# Patient Record
Sex: FEMALE | Race: OTHER | NOT HISPANIC OR LATINO | Employment: FULL TIME | URBAN - METROPOLITAN AREA
[De-identification: names, ages, dates, MRNs, and addresses within clinical notes are randomized per-mention and may not be internally consistent; named-entity substitution may affect disease eponyms.]

---

## 2018-08-10 ENCOUNTER — OFFICE VISIT CONVERTED (OUTPATIENT)
Dept: PULMONOLOGY | Facility: CLINIC | Age: 28
End: 2018-08-10
Attending: PHYSICIAN ASSISTANT

## 2018-12-07 ENCOUNTER — OFFICE VISIT CONVERTED (OUTPATIENT)
Dept: PULMONOLOGY | Facility: CLINIC | Age: 28
End: 2018-12-07
Attending: INTERNAL MEDICINE

## 2019-01-21 ENCOUNTER — HOSPITAL ENCOUNTER (OUTPATIENT)
Dept: CARDIOLOGY | Facility: HOSPITAL | Age: 29
Discharge: HOME | End: 2019-01-21
Attending: INTERNAL MEDICINE

## 2021-03-18 ENCOUNTER — OFFICE VISIT CONVERTED (OUTPATIENT)
Dept: UROLOGY | Facility: CLINIC | Age: 31
End: 2021-03-18
Attending: NURSE PRACTITIONER

## 2021-03-18 ENCOUNTER — CONVERSION ENCOUNTER (OUTPATIENT)
Dept: SURGERY | Facility: CLINIC | Age: 31
End: 2021-03-18

## 2021-03-18 LAB
BILIRUB UR QL STRIP: NEGATIVE
COLOR UR: YELLOW
CONV BACTERIA IN URINE MICRO: 0
CONV CALCIUM OXALATE CRYSTALS /HPF IN URINE SEDIMENT BY MICROSCOPY: 0
CONV CLARITY OF URINE: CLEAR
CONV PROTEIN IN URINE BY AUTOMATED TEST STRIP: NEGATIVE
CONV UROBILINOGEN IN URINE BY AUTOMATED TEST STRIP: 0.2
GLUCOSE UR QL: NEGATIVE
HGB UR QL STRIP: NEGATIVE
KETONES UR QL STRIP: NEGATIVE
LEUKOCYTE ESTERASE UR QL STRIP: NORMAL
NITRITE UR QL STRIP: NEGATIVE
PH UR STRIP.AUTO: 6 [PH]
RBC #/AREA URNS HPF: 0 /[HPF]
RENAL EPI CELLS #/AREA URNS HPF: 0 /[HPF]
SP GR UR: >=1.03
SQUAMOUS SPT QL MICRO: 0
WBC #/AREA URNS HPF: 0 /[HPF]

## 2021-04-30 LAB
EXTERNAL ABO GROUPING: NORMAL
EXTERNAL ANTIBODY SCREEN: NORMAL
EXTERNAL RH FACTOR: POSITIVE
RUBV IGG SERPL IA-ACNC: NORMAL

## 2021-05-10 NOTE — H&P
History and Physical      Patient Name: Bing Pacheco   Patient ID: [id number]   Sex: Female   YOB: 1990    Primary Care Provider: PAOLA CROCKER   Referring Provider: PAOLA CROCKER    Visit Date: March 18, 2021    Provider: OBI Waddell   Location: [facility]   Location Address: [address] [place] [address]  [id number]   Location Phone: [phone]          Chief Complaint  · pt here for urological concerns      History Of Present Illness  The patient is a 30 year old /White female, who is a consultation from PAOLA CROCKER, for the evaluation of mixed incontinence which the patient noted seemed associated with no known event. The patient notes that the stress component is more bothersome.   Symptoms:  She has had recurrent episodes episodes of incontinence with and without urgency. She describes leakage of small amounts of urine. The symptoms are getting worse. The patient uses 5 pads a day.   The following aggravating factors are noted coughing, laughing, exercising, jogging, sneezing, rapid movement, transferring to standing position, walking upstairs, and lifting. The patient drinks 2 caffeinated drinks a day. She reports no additional complaints. She denies dysuria and gross hematuria.   History:  The patient has not been previously evaluated for this condition. Her past medical history is significant for obesity. She denies a history of kidney stones, cerebrovascular accidents, and delivery of a greater than 8 pound child.   Meds:  Current meds have not been tried.        She is not interested in procedures.       Past Medical History  Disease Name Date Onset Notes   Asthma --  --    Urinary Incontinence 03/18/2021 --          Past Surgical History  Procedure Name Date Notes   *I have had no surgeries --  --          Allergy List  Allergen Name Date Reaction Notes   Bactrim DS --  --  --          [facility]  "History  Disease Name Relative/Age Notes   Diabetes, unspecified type Mother/   Mother; Aunt (maternal)         Social History  Finding Status Start/Stop Quantity Notes   Tobacco --  --/-- --  --          Review of Systems  · Constitutional  o Denies  o : fever, chills  · Eyes  o Denies  o : double vision, cataracts  · HENT  o Denies  o : hearing loss, headaches  · Cardiovascular  o Denies  o : chest pain at rest, chest pain with exercise, irregular heart beats, palpitations, leg cramps with exercise  · Respiratory  o Denies  o : shortness of breath, wheezing, sleep apnea  · Gastrointestinal  o Denies  o : heartburn or indigestion, nausea or vomiting, change in abdominal girth, diarrhea, constipation, blood in stools  · Genitourinary  o Admits  o : additional symptoms as noted in HPI  · Integument  o Denies  o : rash, new skin lesions  · Neurologic  o Denies  o : memory difficulties, headache, mini-strokes, seizures  · Endocrine  o Denies  o : hot flashes, thyroid disorders  · Allergic-Immunologic  o Denies  o : immune deficiency, HIV, Hepatitis C      Vitals  Date Time BP Position Site L\R Cuff Size HR RR TEMP (F) WT  HT  BMI kg/m2 BSA m2 O2 Sat FR L/min FiO2 HC       03/18/2021 09:23 AM       15  218lbs 0oz 5'  4\" 37.42 2.11             Physical Examination  · Constitutional  o Appearance  o : Well nourished, well developed patient in no acute distress. Ambulating without difficulty.  · Head and Face  o Head  o :   § Inspection  § : atraumatic, normocephalic  o Face  o :   § Inspection  § : no facial lesions  · Eyes  o Sclerae  o : sclerae white  · Ears, Nose, Mouth and Throat  o Ears  o :   § External Ears  § : appearance within normal limits, no lesions present  o Nose  o :   § External Nose  § : appearance normal  · Neck  o Inspection/Palpation  o : normal appearance, trachea midline  · Respiratory  o Respiratory Effort  o : Breathing is unlabored without accessory muscle use  o Inspection of Chest  o : normal " appearance, no retractions  · Skin and Subcutaneous Tissue  o General Inspection  o : No rashes, lesions or areas of discoloration present. Skin turgor is normal.  · Neurologic  o Mental Status Examination  o :   § Orientation  § : grossly oriented to person, place and time  § Speech/Language  § : communication ability within normal limits  o Gait and Station  o : normal gait, able to stand without difficulty  · Psychiatric  o Judgement and Insight  o : judgment and insight intact, judgement for everyday activities and social situations within normal limits, insight intact  o Mood and Affect  o : mood normal, affect appropriate          Results  · In-Office Procedures  o Lab procedure  § Automated dipstick urinalysis with microscopy (97474)   § Color Ur: Yellow   § Clarity Ur: Clear   § Glucose Ur Ql Strip: Negative   § Bilirub Ur Ql Strip: Negative   § Ketones Ur Ql Strip: Negative   § Sp Gr Ur Qn: >=1.030   § Hgb Ur Ql Strip: Negative   § pH Ur-LsCnc: 6.0   § Prot Ur Ql Strip: Negative   § Urobilinogen Ur Strip-mCnc: 0.2   § Nitrite Ur Ql Strip: Negative   § WBC Est Ur Ql Strip: Trace   § RBC UrnS Qn HPF: 0   § WBC UrnS Qn HPF: 0   § Bacteria UrnS Qn HPF: 0   § Crystals UrnS Qn HPF: 0   § Epithelial Cells (non renal): 0 /HPF  § Epithelial Cells (renal): 0   o Surgical procedure  § IOP - Bladder Scan/Residual Urine (64899)   § Specimen vol Ur: 74       Assessment  · Urinary Incontinence     788.30/R32      Plan  · Orders  o PELVIC FLOOR REHABILITATION CONSULT (PELRE) - 788.30/R32 - 03/18/2021  · Medications  o Medications have been Reconciled  o Transition of Care or Provider Policy  · Instructions  o DISCUSSION:  o I have discussed with the patient the diagnosis of incontinence with potential treatment options in detail. Ample time was given for questions. We will proceed with plans as outlined below.  o PLAN: will refer to pelvic PT and follow up in 6 months   o Timed voiding q 3-4 hours  o Instructions Given for  Kegel Exercises  o Double voiding  o Avoid bladder irritants  o Cut down nighttime fluids  o bladder matters book given.   o Electronically Identified Patient Education Materials Provided Electronically  · Referrals  o ID: [id number] Date: 02/23/2021 Type: Inbound  Specialty: Urology            Electronically Signed by: Raina Mendenhall APRN -Author on March 18, 2021 09:56:49 AM

## 2021-05-14 VITALS — WEIGHT: 218 LBS | RESPIRATION RATE: 15 BRPM | HEIGHT: 64 IN | BODY MASS INDEX: 37.22 KG/M2

## 2021-05-26 LAB — EXTERNAL NIPT: NEGATIVE

## 2021-05-28 VITALS
HEART RATE: 80 BPM | HEIGHT: 65 IN | SYSTOLIC BLOOD PRESSURE: 121 MMHG | BODY MASS INDEX: 39.5 KG/M2 | WEIGHT: 237.06 LBS | RESPIRATION RATE: 16 BRPM | TEMPERATURE: 98.5 F | OXYGEN SATURATION: 97 % | DIASTOLIC BLOOD PRESSURE: 60 MMHG

## 2021-05-28 VITALS
OXYGEN SATURATION: 100 % | BODY MASS INDEX: 39.38 KG/M2 | RESPIRATION RATE: 14 BRPM | HEART RATE: 138 BPM | HEIGHT: 64 IN | TEMPERATURE: 98.1 F | DIASTOLIC BLOOD PRESSURE: 75 MMHG | WEIGHT: 230.69 LBS | SYSTOLIC BLOOD PRESSURE: 102 MMHG

## 2021-05-28 NOTE — PROGRESS NOTES
Patient: TOBIN ROBLES     Acct: [id number]     Report: #[id number]  UNIT #: [id number]     : 1990    Encounter Date:2018  PRIMARY CARE: WING AMATO  ***Signed***  --------------------------------------------------------------------------------------------------------------------  Chief Complaint      Encounter Date      Dec 7, 2018            Primary Care Provider      WING AMATO            Referring Provider      WING AMATO            Patient Complaint      Patient is complaining of      Pt here for 3 month follow up/chest ct results/ Pulmonary Embolism            VITALS      Height 5 ft 5 in / 165.1 cm      Weight 237 lbs 1 oz / 107.096879 kg      BSA 2.13 m2      BMI 39.4 kg/m2      Temperature 98.5 F / 36.94 C - Oral      Pulse 80      Respirations 16      Blood Pressure 121/60 Sitting, Left Arm      Pulse Oximetry 97%, room air      Initial Exhaled Nitrous Oxide      Date:  Dec 7, 2018      Exhaled Nitrous Oxide Results:  12            HPI      The patient is a 28 year old obese female who had pulmonary embolism and was     treated with Xarelto back in July. She followed up in our office with Mindi Chaudhry and she has been on 6 months of anticoagulation.  She has worked on     weight loss but it has been difficult for her. She has only recently started to     try and exercise the past 1 week. She also reports a history of asthma. She only    has a rescue inhaler and has been using this intermittently. She reports     shortness of breath with minimal exertion, she has some wheezing and coughing     that is nonproductive. She denies any chest pain, hemoptysis, lower extremity     edema. The patient reports fatigue and snoring as well as apnea spells at night.          Review of Systems as noted.             Past family, medical, surgical and social histories were all reviewed by myself     with the patient and are unchanged.            Medications were reviewed by  myself with the patient and updated in the chart.            ROS      Constitutional:  Denies: Fatigue, Fever, Weight gain, Weight loss, Chills,     Insomnia, Other      Respiratory/Breathing:  Complains of: Shortness of air, Wheezing, Cough; Denies:    Hemoptysis, Pleuritic pain, Other      Endocrine:  Denies: Polydipsia, Polyuria, Heat/cold intolerance, Abnorml     menstrual pattern, Diabetes, Other      Eyes:  Denies: Blurred vision, Vision Changes, Other      Ears, nose, mouth, throat:  Denies: Mouth lesions, Thrush, Throat pain,     Hoarseness, Allergies/Hay Fever, Post Nasal Drip, Headaches, Recent Head Injury,    Nose Bleeding, Neck Stiffness, Thyroid Mass, Hearing Loss, Ear Fullness, Dry     Mouth, Nasal or Sinus Pain, Dry Lips, Nasal discharge, Nasal congestion, Other      Cardiovascular:  Denies: Palpitations, Syncope, Claudication, Chest Pain, Wake     up Gasping for air, Leg Swelling, Irregular Heart Rate, Cyanosis, Dyspnea on     Exertion, Other      Gastrointestinal:  Denies: Nausea, Constipation, Diarrhea, Abdominal pain, V    omiting, Difficulty Swallowing, Reflux/Heartburn, Dysphagia, Jaundice, Bloating,    Melena, Bloody stools, Other      Genitourinary:  Denies: Urinary frequency, Incontinence, Hematuria, Urgency,     Nocturia, Dysuria, Testicular problems, Other      Musculoskeletal:  Denies: Joint Pain, Joint Stiffness, Joint Swelling, Myalgias,    Other      Hematologic/lymphatic:  DENIES: Lymphadenopathy, Bruising, Bleeding tendencies,     Other      Neurological:  Denies: Headache, Numbness, Weakness, Seizures, Other      Psychiatric:  Denies: Anxiety, Appropriate Effect, Depression, Other      Sleep:  No: Excessive daytime sleep, Morning Headache?, Snoring, Insomnia?, Stop    breathing at sleep?, Other      Integumentary:  Denies: Rash, Dry skin, Skin Warm to Touch, Other      Immunologic/Allergic:  Denies: Latex allergy, Seasonal allergies, Asthma,     Urticaria, Eczema, Other       Immunization status:  No: Up to date            FAMILY/SOCIAL/MEDICAL HX      Surgical History:  Yes: Other Surgeries (dnc); No: Abdominal Surgery,     Appendectomy, Bladder Surgery, Bowel Surgery, CABG, Cholecystectomy, Head     Surgery, Oral Surgery, Orthopedic Surgery, Vascular Surgery      Stroke - Family Hx:  Aunt      Heart - Family Hx:  Grandparent      Is Father Still Living?:  Yes      Is Mother Still Living?:  Yes      Social History:  Tobacco Use      Smoking status:  Never smoker      Anticoagulation Therapy:  Yes      Antibiotic Prophylaxis:  No      Medical History:  Yes: Asthma (HAVE NOT USED INHALER IN 9 YRS.), Depression,     Anxiety, Miscellaneous Medical/oth (h/o PE); No: Blood Disease,     Chemotherapy/Cancer, Chronic Bronchitis/COPD, Congestive Heart Failu, Deafness     or Ringing Ears, Diabetes, Heart Attack, Hemorrhoids/Rectal Prob, High Blood     Pressure, Shortness Of Breath, Sinus Trouble      Psychiatric History      Depression/Anxiety            PREVENTION      Hx Influenza Vaccination:  No      Influenza Vaccine Declined:  Yes      2 or More Falls Past Year?:  No      Fall Past Year with Injury?:  No      Hx Pneumococcal Vaccination:  No      Encouraged to follow-up with:  PCP regarding preventative exams.      Chart initiated by      Carmen Raya MA            ALLERGIES/MEDICATIONS      Allergies:        Coded Allergies:             SULFAMETHOXAZOLE (Verified  Adverse Reaction, Severe, 12/7/18)           TRIMETHOPRIM (Verified  Adverse Reaction, Severe, 12/7/18)      Medications    Last Reconciled on 12/7/18 17:49 by ROBBY MANN DO      Fluticasone/Vilanterol 100-25 Mcg Inh (Breo Ellipta 100-25 Mcg Inh) 1 Each     Blst.w.dev      1 PUFF INH QDAY, #1 INH 3 Refills         Prov: ROBBY MANN         12/7/18       Sertraline HCl (Sertraline*) Unknown Strength Oral.conc      PO QDAY, ML         Reported         12/7/18       MDI-Albuterol (Ventolin HFA) 8 Gm Hfa.aer.ad      1 PUFFS INH  Q4H PRN for SHORTNESS OF BREATH, #1 MDI 6 Refills         Prov: IsidoroDee PA-C         8/10/18       Escitalopram Oxalate (Escitalopram Oxalate*) 10 Mg Tablet      10 MG PO QDAY, TAB         Reported         8/10/18       busPIRone HCl (busPIRone HCl) 10 Mg Tablet      10 MG PO BID, #60 TAB         Reported         8/10/18      Current Medications      Current Medications Reviewed 12/7/18            EXAM      Vital signs reviewed.      GENERAL:  Morbidly obese female.       HEENT: Pupils are equally round and reactive to light and accommodation.      Extraocular muscles intact bilateral. Nares patent without hypertrophy of the     turbinates.  TM's are clear bilaterally. Small oropharynx without lesions or     erythema.       NECK:  Supple without tracheal deviation or lymphadenopathy. No thyromegaly     appreciated.       LYMPHATICS: No cervical or supraclavicular lymphadenopathy.       RESPIRATORY:  Clear to auscultation bilaterally, no wheezes, rales or rhonchi,     tympanic to percussion.      CVS:  Regular rate and rhythm, no murmurs, rubs or gallops, no lower extremity     edema, , equal radial pulses.      GI: Abdomen soft, truncal obesity,  nontender, nondistended, no hepatomegaly     appreciated.  Bowel sounds present in all 4 quadrants.       MUSCULOSKELETAL: No erythema, warmth or fluctuance over the major joints     including the knees, ankles, wrists and elbows.        SKIN: No rashes or lesions.       NEUROLOGICAL: Alert and oriented X 3.  No focal deficits on exam. Cranial nerves    II-XII intact bilaterally.       PSYCH: Patient has appropriate mood and affect.      Vtials      Vitals:             Height 5 ft 5 in / 165.1 cm           Weight 237 lbs 1 oz / 107.070954 kg           BSA 2.13 m2           BMI 39.4 kg/m2           Temperature 98.5 F / 36.94 C - Oral           Pulse 80           Respirations 16           Blood Pressure 121/60 Sitting, Left Arm           Pulse Oximetry 97%, room air             REVIEW      Results Reviewed      PCCS Results Reviewed?:  Yes Prev Radiology Results, Yes Previous Mecial Records      Radiographic Results               [facility]                   [facility]                PACS RADIOLOGY REPORT            Patient: TOBIN ROBLES   Acct: #[id number]   Report: #[id number]            UNIT #: [id number]    DOS: 18 0930      INSURANCE:[employer]   ORDER #:[id number]      LOCATION:[facility]     : 1990            PROVIDERS      ADMITTING:     ATTENDING: Dee Chaudhry PA-C      FAMILY:  NONE,MD   ORDERING:  Dee Chaudhry PA-C         OTHER:    DICTATING:  Manuel Weeks MD, IV            REQ #:[id number]   EXAM:CHW - CT CHEST with CONTRAST      REASON FOR EXAM:        REASON FOR VISIT:  SOA            *******Signed******         PROCEDURE:   CT CHEST W/ CONTRAST             COMPARISON:   [facility], CT, CHEST W/ CONTRAST, 2018, 16:47.             INDICATIONS:   RECENT PE, STILL ON BLOOD THINNER. FOLLOW UP AND STILL HAVING     SOA. PT DENIES       PREGNANCY.             PROTOCOL:     Pulmonary embolism imaging protocol performed                RADIATION:     DLP: 623.3mGy*cm          Automated exposure control was utilized to minimize radiation dose.              CONTRAST:   100cc Isovue 370 I.V.             LABS:     eGFR: ml/min/1.73m2             TECHNIQUE:   Axial images of the chest with intravenous contrast.             FINDINGS:      No evidence of pneumothorax or pleural effusion.  No evidence of pneumonia or     suspicious pulmonary       mass.  The thoracic aorta has a normal caliber.  No pulmonary emboli are id    entified on today's       examination.  There is fatty infiltration of the liver.  There is a 1.2 cm stone    in the       gallbladder.             IMPRESSION:              1.  No pulmonary emboli are identified.             2.  Fatty infiltration of the liver.              3.  Cholelithiasis              MARCIA BROTHERS MD             Electronically Signed and Approved By: MARCIA BROTHERS MD on 12/05/2018 at     10:08                        Until signed, this is an unconfirmed preliminary report that may contain      errors and is subject to change.                                              RUBENJE:      D:12/05/18 1008            Assessment      Asthma - J45.909            Shortness of breath - R06.02            Apnea - R06.81            Snoring - R06.83            Obesity (BMI 30-39.9) - E66.9            Notes      New Medications      * Sertraline HCl (Sertraline*) Unknown Strength ORAL.CONC: PO QDAY      * Fluticasone/Vilanterol 100-25 Mcg Inh (Breo Ellipta 100-25 Mcg Inh) 1 EACH       BLST.W.DEV: 1 PUFF INH QDAY #1      Discontinued Medications      * RIVAROXABAN (Xarelto) 15 MG TABLET: 15 MG PO BID 21 Days #42         Instructions: Avoid use of Xarelto if CrCl is less than 30 ml/hr Avoid use of       Xarelto with moderate to severe Hepatic impairment.      New Diagnostics      * 6 Min Walk With Pulse Ox, Routine         Dx: Asthma - J45.909      * PFT-Comp, PrePost,DLCO,BodyBox, Routine         Dx: Asthma - J45.909      * Basic Sleep Study, Routine         Dx: Apnea - R06.81      ASSESSMENT:      1. Acute bilateral pulmonary emboli status post 6 month treatment with  Xarelto.      2. History of PCOS, was on oral contraceptives pills at the time of her acute     pulmonary embolism.        3. Morbid obesity with BMI of 39.4.      4. History of snoring and apneic spells.       5. Mild intermittent asthma without exertion.             PLAN:       1. it is okay to stop Xarelto as she has completed 6 months of therapy.       2.  I counseled the patient on weight loss and encouraged dietary changes and to    increase her activity. She has been referred to a dietitian while she was in the    hospital.       3. In regards to her asthma, I have started her Breo 100/25 one puff  daily in     addition to her rescue inhaler.       4. We will obtain a sleep study prior to follow up.       5. Follow up in 2-3 months.            Patient Education      ACO BMI High above 25:  Counseling Given, Encouraged weight loss, Encourage     dietary changes      Patient Education Provided:  Acute Asthma, How to use an Inhaler, Sleep Apnea      Time Spent:  > 50% /Coord [facility]                 Disclaimer: Converted document may not contain table formatting or lab diagrams. Please see [employer] for the authenticated document.

## 2021-05-28 NOTE — PROGRESS NOTES
Patient: TOBIN ROBLES     Acct: [id number]     Report: #[id number]  UNIT #: [id number]     : 1990    Encounter Date:08/10/2018  PRIMARY CARE: WING AMATO  ***Signed***  --------------------------------------------------------------------------------------------------------------------  Chief Complaint      Encounter Date      Aug 10, 2018            Primary Care Provider      WING AMATO            Referring Provider      WING AMATO            Patient Complaint      Patient is complaining of      hmh f/u            VITALS      Height 5 ft 4 in / 162.56 cm      Weight 230 lbs 11 oz / 104.859027 kg      BSA 2.23 m2      BMI 39.6 kg/m2      Temperature 98.1 F / 36.72 C - Oral      Pulse 138      Respirations 14      Blood Pressure 102/75 Sitting, Left Arm      Pulse Oximetry 100%, roomair            HPI      The patient is a pleasant 27-year-old white female who was recently seen by Dr. Albarran while hospitalized at [facility]. She presented with dyspnea that gr    adually worsened over the course of the day associated with chest tightness. She    was found to have extensive bilateral lobar and segmental pulmonary emboli and     evidence of right heart strain on chest CT. She was hemodynamically stable. She     had recently had a log car ride about a month ago, 10-12 hours in a car driving     from [place] and has been started on oral contraceptives the week prior to     her presentation for PCOS. She is also obese with a BMI of 39.6. She had never     previously had an episode of pulmonary embolism or any other clotting issues.     She does have a history of asthma and is currently not taking anything for it.     She is a never smoker. She was treated with IV heparin, transitioned to Xarelto,    and is currently still on the 3 weeks of Xarelto 15 mg b.i.d. She did have an     episode actually yesterday morning where she woke up and had a bunch of blood in    her mouth.  She called her primary care provider and was instructed to present to    the emergency department where she was felt to have bleeding secondary to acute     gingivitis. She was instructed to use a soft toothbrush and brush her teeth     gently and not floss and continue on her Xarelto as prescribed. She has not had     any other bleeding issues and no bleeding issues since yesterday morning with     her mouth in particular. She is still having some mild dyspnea on exertion     relieved with rest, but has been walking and trying to increase her activity     gradually. She denies any lower extremity edema, orthopnea, or abdominal     distension. She did have an echocardiogram done while hospitalized which showed     normal LV function. Did not mention any issues with right heart strain and right    ventricle and right atrium were noted to be of normal size and with good     contractility. Her oral contraceptives were discontinued.             I have reviewed her review of systems, medical, surgical, and family history and    agree with those as entered.            ROS      Constitutional:  Denies: Fatigue, Fever, Weight gain, Weight loss, Chills,     Insomnia, Other      Respiratory/Breathing:  Complains of: Shortness of air; Denies: Wheezing, Cough,    Hemoptysis, Pleuritic pain, Other      Endocrine:  Denies: Polydipsia, Polyuria, Heat/cold intolerance, Abnorml     menstrual pattern, Diabetes, Other      Eyes:  Denies: Blurred vision, Vision Changes, Other      Ears, nose, mouth, throat:  Denies: Mouth lesions, Thrush, Throat pain,     Hoarseness, Allergies/Hay Fever, Post Nasal Drip, Headaches, Recent Head Injury,    Nose Bleeding, Neck Stiffness, Thyroid Mass, Hearing Loss, Ear Fullness, Dry     Mouth, Nasal or Sinus Pain, Dry Lips, Nasal discharge, Nasal congestion, Other      Cardiovascular:  Denies: Palpitations, Syncope, Claudication, Chest Pain, Wake     up Gasping for air, Leg Swelling, Irregular Heart Rate,  Cyanosis, Dyspnea on     Exertion, Other      Gastrointestinal:  Denies: Nausea, Constipation, Diarrhea, Abdominal pain,     Vomiting, Difficulty Swallowing, Reflux/Heartburn, Dysphagia, Jaundice,     Bloating, Melena, Bloody stools, Other      Genitourinary:  Denies: Urinary frequency, Incontinence, Hematuria, Urgency,     Nocturia, Dysuria, Testicular problems, Other      Musculoskeletal:  Denies: Joint Pain, Joint Stiffness, Joint Swelling, Myalgias,    Other      Hematologic/lymphatic:  DENIES: Lymphadenopathy, Bruising, Bleeding tendencies,     Other      Neurological:  Denies: Headache, Numbness, Weakness, Seizures, Other      Psychiatric:  Denies: Anxiety, Appropriate Effect, Depression, Other      Sleep:  No: Excessive daytime sleep, Morning Headache?, Snoring, Insomnia?, Stop    breathing at sleep?, Other      Integumentary:  Denies: Rash, Dry skin, Skin Warm to Touch, Other      Immunologic/Allergic:  Denies: Latex allergy, Seasonal allergies, Asthma,     Urticaria, Eczema, Other      Immunization status:  No: Up to date            FAMILY/SOCIAL/MEDICAL HX      Surgical History:  Yes: Other Surgeries (dnc); No: Abdominal Surgery,     Appendectomy, Bladder Surgery, Bowel Surgery, CABG, Cholecystectomy, Head     Surgery, Oral Surgery, Orthopedic Surgery, Vascular Surgery      Stroke - Family Hx:  Aunt      Heart - Family Hx:  Grandparent      Is Father Still Living?:  Yes      Is Mother Still Living?:  Yes      Social History:  Tobacco Use      Smoking status:  Never smoker      Anticoagulation Therapy:  Yes      Antibiotic Prophylaxis:  No      Medical History:  Yes: Asthma (HAVE NOT USED INHALER IN 9 YRS.), Depression,     Anxiety; No: Blood Disease, Chemotherapy/Cancer, Chronic Bronchitis/COPD,     Congestive Heart Failu, Deafness or Ringing Ears, Diabetes, Heart Attack,     Hemorrhoids/Rectal Prob, High Blood Pressure, Shortness Of Breath, Miscellaneous    Medical/oth      Psychiatric History       depression,anxiety            PREVENTION      Hx Influenza Vaccination:  No      Influenza Vaccine Declined:  Yes      2 or More Falls Past Year?:  No      Fall Past Year with Injury?:  No      Hx Pneumococcal Vaccination:  No      Encouraged to follow-up with:  PCP regarding preventative exams.      Chart initiated by      Fanny Machuca ma            ALLERGIES/MEDICATIONS      Allergies:        Coded Allergies:             SULFAMETHOXAZOLE (Verified  Adverse Reaction, Severe, 8/10/18)           TRIMETHOPRIM (Verified  Adverse Reaction, Severe, 8/10/18)      Medications    Last Reconciled on 8/10/18 10:14 by RICH MCKEON      Rivaroxaban (Xarelto) 20 Mg Tablet      20 MG PO QDAY, #30 TAB 2 Refills         Prov: Dee Chaudhry PA-C         8/10/18       MDI-Albuterol (Ventolin HFA*) 8 Gm Hfa.aer.ad      1 PUFFS INH Q4H Y for SHORTNESS OF BREATH, #1 MDI 6 Refills         Prov: Dee Chaudhry PA-C         8/10/18       (muscle relaxer ) Unknown Strength  No Conflict Check               Reported         8/10/18       Escitalopram Oxalate (Escitalopram Oxalate*) 10 Mg Tablet      10 MG PO QDAY, TAB         Reported         8/10/18       busPIRone HCl (busPIRone HCl) 10 Mg Tablet      10 MG PO BID, #60 TAB         Reported         8/10/18       Rivaroxaban (Xarelto) 15 Mg Tablet      15 MG PO BID for 21 Days, #42 TAB 0 Refills         Prov: Barrera Aguirre         7/25/18      Current Medications      Current Medications Reviewed 8/10/18            EXAM      Vital signs reviewed.      HEENT: Pupils are equally round and reactive to light and accommodation.       Extraocular muscles intact bilateral. Nares patent without hypertrophy of the     turbinates.   TMs are clear bilaterally. Small oropharynx without lesions or     erythema.      NECK:   Supple without tracheal deviation or lymphadenopathy. No thyromegaly     appreciated.      LYMPHATICS: No cervical or supraclavicular lymphadenopathy.      RESPIRATORY:    Lungs are clear to auscultation bilaterally. No wheezes, rhonchi,     or crackles appreciate. Normal work of breathing noted.      CVS:   Regular rate and rhythm, no murmurs, rubs or gallops, no lower extremity     edema, equal radial pulses.      GI: Abdomen soft, nontender, nondistended, no hepatomegaly appreciated.   Bowel     sounds present in all 4 quadrants.      MUSCULOSKELETAL: No erythema, warmth or fluctuance over the major joints     including the knees, ankles, wrists and elbows.        SKIN: No rashes or lesions.      NEUROLOGICAL: Alert and oriented X 3.   No focal deficits on exam. Cranial nerves    II-XII intact bilaterally.      PSYCH: Patient has appropriate mood and affect.      Vtials      Vitals:             Height 5 ft 4 in / 162.56 cm           Weight 230 lbs 11 oz / 104.448022 kg           BSA 2.23 m2           BMI 39.6 kg/m2           Temperature 98.1 F / 36.72 C - Oral           Pulse 138           Respirations 14           Blood Pressure 102/75 Sitting, Left Arm           Pulse Oximetry 100%, roomair            REVIEW      Results Reviewed      PCCS Results Reviewed?:  Yes Prev Lab Results, Yes Prev Radiology Results, Yes     Previous Mecial Records      Radiographic Results               [facility]                PACS RADIOLOGY REPORT            Patient: TOBIN ROBLES   Acct: #[id number]   Report: #[id number]            UNIT #: [id number]    DOS: 18 1618      INSURANCE:[employer]   ORDER #:[id number]      LOCATION:ER     : 1990            PROVIDERS      ADMITTING:     ATTENDING:       FAMILY:  NONE,MD   ORDERING:  DEBORAH TRIANA         OTHER:    DICTATING:  ANDI NOYOLA MD            REQ #:[id number]   EXAM:CHW - CT CHEST with CONTRAST      REASON FOR EXAM:  Shortness of Breath      REASON FOR VISIT:  SOA/CHEST PRESSURE            *******Signed******         PROCEDURE:   CT CHEST W/  CONTRAST             COMPARISON:   None.             INDICATIONS:   Shortness of Breath             TECHNIQUE:   After obtaining the patient's consent, CT images were obtained with    non-ionic       intravenous contrast material.               PROTOCOL:     Pulmonary embolism imaging protocol performed                RADIATION:     DLP: 769 mGy*cm          Automated exposure control was utilized to minimize radiation dose.       CONTRAST:   100 cc Isovue 370 I.V.             FINDINGS:         Visualized soft tissue structures at the base of the neck including the thyroid     are unremarkable.        There is no lower cervical or axillary lymphadenopathy.             There are bilateral pulmonary emboli involving the lobar arteries including the     left upper, left       lower, right middle and right lower lobe.  There is bowing of the     intraventricular septum       concerning for right-sided heart strain.             The heart size is normal.  There is no pericardial effusion.  The aorta is     normal in caliber.  The       main pulmonary artery measures at the upper limit of normal.  There is no     mediastinal or hilar       lymphadenopathy.  Calcified right hilar granulomas.             The tracheobronchial tree is patent.  There is no abnormal bronchial wall     thickening or       bronchiectasis.  There are calcified right lower lobe granuloma is.  There is no    pleural effusion       or pneumothorax.  No focal airspace consolidation or evidence of pulmonary     infarct.             There is cholelithiasis without evidence of acute cholecystitis.  No acute     osseous abnormality.             CONCLUSION:         Extensive bilateral lobar and segmental artery pulmonary emboli with radiologic     evidence of       right-sided heart strain. Further evaluation with echocardiography may be     indicated.               Cholelithiasis without evidence of acute cholecystitis.             This report was  communicated by telephone to RICH Aggarwal at the dictation     time shown below.                ANDI NOYOLA MD             Electronically Signed and Approved By: ANDI NOYOLA MD on 7/23/2018 at 17:27                           Until signed, this is an unconfirmed preliminary report that may contain      errors and is subject to change.                                              BATB1:      D:07/23/18 1727            [id number]  [id number]                                 [facility] [employer]                            Aleks[place] Merino  [id number]               __________________________________________________________________________             Patient Name:                   Attending Physician:      Bing Morton M.D.             Patient Visit # MR #            Admit Date  Disch Date     Location      [id number]    [id number]      07/23/2018                 [id number]             Date of Birth      1990      __________________________________________________________________________      835 - DIAGNOSTIC REPORT             2-D AND M-MODE ECHOCARDIOGRAM REPORT             DATE:  07/24/2018.             INDICATION:                                    NORMAL RANGE        TEST RESULTS      _________________________________________________________________                                                   Systolic       Diastolic      RVID                    0.7-2.4      LVID                    3.7-5.4         3.1            4.5      POST. WALL THICKNESS    0.8-1.1                        0.9      SEPTAL WALL THICKNESS   0.7-1.2                        0.9      LAID                    1.9-3.8                        3.5      AORTIC ROOT DIMENSION   2.0-3.7                        2.6      MTRL. VLV. BRIAN. D.D.R. 80mm/sec-150mm/sec       _________________________________________________________________             COMMENTS:   M-Mode and 2-D summary.             1.  MITRAL VALVE:        Normal.      2.  AORTIC VALVE:        Normal.      3.  TRICUSPID VALVE:     Normal.      4.  PULMONIC VALVE:      Normal.      5.  AORTIC ROOT:         Normal.      6.  LEFT ATRIUM:         No evidence of thrombi.      7.  LEFT VENTRICLE:      Normal-sized left ventricle with good contractility          with left ventricular ejection fraction of 70%.  No evidence of thrombi.      8.  RIGHT VENTRICLE:     Appears to be normal size with good contractility.      9.  RIGHT ATRIUM:        Normal.      10. PERICARDIUM:         No evidence of pericardial effusion.             Color Doppler study showed mild mitral regurgitation.  No evidence of any      tricuspid regurgitation seen.  No evidence of any volume or pressure overload      of the right ventricle.             CONCLUSION:      1.  Normal-sized left ventricle with good systolic function.      2.  Mild mitral regurgitation.             To be electronically signed in [employer]      [id number] ALEX MIRELES M.D.             MG:elaís      D:  07/24/2018 19:55      T:  07/24/2018 20:26      #[id number]             Until signed, this is an unconfirmed preliminary report that may contain      errors and is subject to change.                   07/25/18 1513  <Electronically signed by Alex Mireles MD>            Assessment      Pulmonary embolism - I26.99            SOB (shortness of breath) - R06.02            History of asthma - Z87.09            Notes      New Medications      * busPIRone HCl 10 MG TABLET: 10 MG PO BID #60      * ESCITALOPRAM OXALATE (Escitalopram Oxalate*) 10 MG TABLET: 10 MG PO QDAY      * (muscle relaxer ) Unknown Strength:       * MDI-Albuterol (Ventolin HFA*) 8 GM HFA.AER.AD: 1 PUFFS INH Q4H PRN SHORTNESS       OF BREATH #1         Dx: Pulmonary embolism - I26.99      * RIVAROXABAN (Xarelto) 20  MG TABLET: 20 MG PO QDAY #30         Instructions: To begin after 21 days of Xarelto 15mg po bid are complete.         Dx: Pulmonary embolism - I26.99      Discontinued Medications      * RIVAROXABAN (Xarelto) 20 MG TABLET: 20 MG PO QDAY 30 Days #30         Instructions: after completing 21 days of xarelto 15mg bid      New Diagnostics      * Chest W/ Cont CT, 3 Months         Dx: Pulmonary embolism - I26.99      ASSESSMENT:      1. Acute bilateral pulmonary emboli, currently on anticoagulation with Xarelto.      2. History of PCOS, had recently been started on oral contraceptives which are     now discontinued.      3. Morbid obesity with BMI of 39.6.      4. Dyspnea, improving.      5. History of asthma without acute exacerbation.            PLAN:       1. At this time, I have instructed the patient to complete her 3 weeks of     Xarelto 15 mg p.o. b.i.d. and then start on the Xarelto 20 mg p.o. daily. She     will likely require treatment for 3-6 months with anticoagulation, as thus far h    er hypercoagulability workup has been negative, but her PE was likely provoked     by her recent initiation of oral contraceptives in the setting of her obesity     and recent long car travel.       2. I have counseled her on increasing her activity gradually and watching her     caloric intake to work on weight loss.      3. She has had a history of asthma and is currently not taking anything for     that. I have prescribed her Ventolin rescue inhaler to use as needed and have     shown her how to use that today. We will repeat a chest CT scan with contrast     for her in 3 months to followup for resolution of her PEs.       4. I will have her followup in 3 months with Dr. Albarran or sooner if needed.            Patient Education      ACO BMI High above 25:  Counseling Given, Encouraged weight loss      Patient Education Provided:  How to use an Inhaler      Time Spent:  > 50% /[facility]                 Disclaimer:  Converted document may not contain table formatting or lab diagrams. Please see [email or web address] for the authenticated document.

## 2021-08-26 LAB
EXTERNAL HEMATOCRIT: 39 %
EXTERNAL HEMOGLOBIN: 12.7 G/DL
GLUCOSE 1H P 100 G GLC PO SERPL-MCNC: 116 MG/DL (ref 74–180)

## 2021-09-20 ENCOUNTER — TELEPHONE (OUTPATIENT)
Dept: UROLOGY | Facility: CLINIC | Age: 31
End: 2021-09-20

## 2021-09-28 ENCOUNTER — ROUTINE PRENATAL (OUTPATIENT)
Dept: OBSTETRICS AND GYNECOLOGY | Facility: CLINIC | Age: 31
End: 2021-09-28

## 2021-09-28 VITALS — DIASTOLIC BLOOD PRESSURE: 69 MMHG | SYSTOLIC BLOOD PRESSURE: 106 MMHG | WEIGHT: 221 LBS | BODY MASS INDEX: 37.93 KG/M2

## 2021-09-28 DIAGNOSIS — O99.210 OBESITY IN PREGNANCY, ANTEPARTUM: ICD-10-CM

## 2021-09-28 DIAGNOSIS — Z34.80 SUPERVISION OF OTHER NORMAL PREGNANCY: Primary | ICD-10-CM

## 2021-09-28 DIAGNOSIS — Z86.711 HISTORY OF PULMONARY EMBOLISM: ICD-10-CM

## 2021-09-28 DIAGNOSIS — N89.8 VAGINAL ITCHING: ICD-10-CM

## 2021-09-28 LAB
CANDIDA SPECIES: POSITIVE
GARDNERELLA VAGINALIS: NEGATIVE
GLUCOSE UR STRIP-MCNC: NEGATIVE MG/DL
PROT UR STRIP-MCNC: ABNORMAL MG/DL
T VAGINALIS DNA VAG QL PROBE+SIG AMP: NEGATIVE

## 2021-09-28 PROCEDURE — 99214 OFFICE O/P EST MOD 30 MIN: CPT | Performed by: OBSTETRICS & GYNECOLOGY

## 2021-09-28 PROCEDURE — 87480 CANDIDA DNA DIR PROBE: CPT | Performed by: OBSTETRICS & GYNECOLOGY

## 2021-09-28 PROCEDURE — 87660 TRICHOMONAS VAGIN DIR PROBE: CPT | Performed by: OBSTETRICS & GYNECOLOGY

## 2021-09-28 PROCEDURE — 87510 GARDNER VAG DNA DIR PROBE: CPT | Performed by: OBSTETRICS & GYNECOLOGY

## 2021-09-28 RX ORDER — FLUCONAZOLE 200 MG/1
200 TABLET ORAL
Qty: 6 TABLET | Refills: 2 | Status: SHIPPED | OUTPATIENT
Start: 2021-09-28 | End: 2021-11-04

## 2021-09-28 RX ORDER — PRENATAL VIT/IRON FUM/FOLIC AC 27MG-0.8MG
1 TABLET ORAL DAILY
COMMUNITY
End: 2022-10-12

## 2021-09-28 NOTE — PROGRESS NOTES
Chief Complaint: Scheduled visit    HPI: 31 y.o.  at 29w3d   Positive baby movement  Some left lower quadrant pain, probable round ligament pain discussed  Complains of vaginal discharge with itching  Swelling on labia x2 weeks    Vitals:    21 1009   BP: 106/69   Weight: 100 kg (221 lb)       See OB flowsheet also for pregnancy related data.  Speculum exam: Discharge noted.  Labia appears hypertrophied, normal during pregnancy.  No abscess or skin infection noted.  Chronic redness with some pigmentation between breasts.  Dispense Diflucan    A/P  1. Intrauterine pregnancy at 29w3d   2. Pregnancy Risk:  HIGH RISK        Diagnoses and all orders for this visit:    1. Supervision of other normal pregnancy (Primary)  -     POC Urinalysis Dipstick    2. Obesity in pregnancy, antepartum    3. History of pulmonary embolism    4. Vaginal itching  -     fluconazole (Diflucan) 200 MG tablet; Take 1 tablet by mouth Every 3 (Three) Days.  Dispense: 6 tablet; Refill: 2    Ultrasound ordered for growth.  Patient on Lovenox due to history of pulmonary embolism.  (While on OCP)  Growth ultrasound to monitor with hypercoagulable state.  Discussed need to change to heparin at 35 weeks  Plan induction at 39 weeks.    Encouraged fetal kick counts, 10 movements in 2 hours every day.  To labor and delivery if lack fetal movement  Routine labor warnings were discussed and indications for L & D f/u including bleeding, regular contractions, decreased fetal movement or/and rupture of membranes.   Discussed Covid vaccination in pregnancy including CDC guidelines.  Vaccination strongly encouraged with risk of potential severe illness in unvaccinated.    Pre-eclampsia symptoms were discussed and warnings were given.       -----------------------  PLAN:   Return in about 2 weeks (around 10/12/2021).    Carlos Nichols Sr., MD  2021 10:49 EDT

## 2021-10-01 ENCOUNTER — TELEPHONE (OUTPATIENT)
Dept: OBSTETRICS AND GYNECOLOGY | Facility: CLINIC | Age: 31
End: 2021-10-01

## 2021-10-10 ENCOUNTER — HOSPITAL ENCOUNTER (OUTPATIENT)
Facility: HOSPITAL | Age: 31
Discharge: HOME | End: 2021-10-10
Attending: OBSTETRICS & GYNECOLOGY | Admitting: OBSTETRICS & GYNECOLOGY

## 2021-10-10 ENCOUNTER — HOSPITAL ENCOUNTER (OUTPATIENT)
Facility: HOSPITAL | Age: 31
End: 2021-10-10
Attending: OBSTETRICS & GYNECOLOGY | Admitting: OBSTETRICS & GYNECOLOGY

## 2021-10-10 VITALS — OXYGEN SATURATION: 99 % | HEART RATE: 75 BPM | SYSTOLIC BLOOD PRESSURE: 115 MMHG | DIASTOLIC BLOOD PRESSURE: 67 MMHG

## 2021-10-10 PROCEDURE — G0463 HOSPITAL OUTPT CLINIC VISIT: HCPCS

## 2021-10-10 PROCEDURE — 59025 FETAL NON-STRESS TEST: CPT

## 2021-10-10 NOTE — SIGNIFICANT NOTE
10/10/21 1514   Nonstress Test   Reason for NST OB Triage   Acoustic Stimulator No   Uterine Irritability Yes   Contractions Irregular   Contraction Frequency (Minutes) 4-7   Fetal Assessment   Fetal Movement active   Fetal HR Assessment Method external   Fetal HR (beats/min) 130   Fetal Heart Baseline Rate normal range   Fetal HR Variability moderate (amplitude range 6 to 25 bpm)   Fetal HR Accelerations lasting at least 15 seconds; greater than/equal to 15 bpm   Fetal HR Decelerations absent   Fetal HR Tracing Category Category I   Sinusoidal Pattern Present absent   Interpretation A   Nonstress Test Interpretation A Reactive   /67   Pulse 75   LMP 2021   SpO2 99% 31 1/7 weeks gestation   on monitor at 1435 to 1520

## 2021-10-11 NOTE — PROGRESS NOTES
Obstetrical Non-stress Test Interpretation     Name:  Bing Poe  MRN: [id number]    31 y.o. female  at 31w2d    Indication: Complains of contractions  Weeks Gestation: 31w2d     Patient presented to triage with complaints of contractions.    Baseline: 140 BPM  Variability:   Moderate/Normal (amplitude 6-25 bpm)  Accelerations: Present (32 weeks+) 15 x 15 bpm  Decelerations: Absent   Contractions:  Irregular    Cervix fingertip, long, high, ballotable.  Per RN exam    Impression:  Reactive NST, not labor    Plan: Labor precautions, encourage p.o. hydration   Discharge to home.  Keep follow up per office instructions.      Carlos Nichols Sr., MD  10/11/2021  08:45 EDT

## 2021-10-14 ENCOUNTER — ROUTINE PRENATAL (OUTPATIENT)
Dept: OBSTETRICS AND GYNECOLOGY | Facility: CLINIC | Age: 31
End: 2021-10-14

## 2021-10-14 VITALS — BODY MASS INDEX: 37.93 KG/M2 | WEIGHT: 221 LBS

## 2021-10-14 DIAGNOSIS — Z86.711 HISTORY OF PULMONARY EMBOLISM: ICD-10-CM

## 2021-10-14 DIAGNOSIS — B37.9 CANDIDIASIS: ICD-10-CM

## 2021-10-14 DIAGNOSIS — O99.210 OBESITY IN PREGNANCY, ANTEPARTUM: Primary | ICD-10-CM

## 2021-10-14 DIAGNOSIS — Z34.80 SUPERVISION OF OTHER NORMAL PREGNANCY: ICD-10-CM

## 2021-10-14 LAB
CANDIDA SPECIES: NEGATIVE
GARDNERELLA VAGINALIS: NEGATIVE
GLUCOSE UR STRIP-MCNC: NEGATIVE MG/DL
PROT UR STRIP-MCNC: ABNORMAL MG/DL
T VAGINALIS DNA VAG QL PROBE+SIG AMP: NEGATIVE

## 2021-10-14 PROCEDURE — 87510 GARDNER VAG DNA DIR PROBE: CPT | Performed by: OBSTETRICS & GYNECOLOGY

## 2021-10-14 PROCEDURE — 87660 TRICHOMONAS VAGIN DIR PROBE: CPT | Performed by: OBSTETRICS & GYNECOLOGY

## 2021-10-14 PROCEDURE — 99214 OFFICE O/P EST MOD 30 MIN: CPT | Performed by: OBSTETRICS & GYNECOLOGY

## 2021-10-14 PROCEDURE — 87480 CANDIDA DNA DIR PROBE: CPT | Performed by: OBSTETRICS & GYNECOLOGY

## 2021-10-14 NOTE — PROGRESS NOTES
Chief Complaint:  Scheduled OB visit    HPI: 31 y.o.  at 31w5d   Patient complains of persistent itching and discharge even though completed course of Diflucan.  Positive baby movement    Vitals:    10/14/21 1256   Weight: 100 kg (221 lb)       See OB flowsheet also for pregnancy related data.  Fundal height 34 cm    Ultrasound today notes 59th percentile.  Vertex.  Grade 1 placenta  ARCHANA 14.9    A/P  1. Intrauterine pregnancy at 31w5d   2. Pregnancy Risk:  COMPLICATED  Complicated by obesity, Lovenox use, LGA      Diagnoses and all orders for this visit:    1. Obesity in pregnancy, antepartum (Primary)  -     US Ob 14 + Weeks Single or First Gestation; Future, this ultrasound will be canceled    2. Supervision of other normal pregnancy  -     POC Urinalysis Dipstick    3. History of pulmonary embolism  Continue Lovenox, plan change to heparin at 35 weeks    4. Candidiasis  -     terconazole (Terazol 7) 0.4 % vaginal cream; Insert 1 applicator into the vagina Every Night.  Dispense: 14 each; Refill: 2        Encouraged fetal kick counts, 10 movements in 2 hours every day.  To labor and delivery if lack fetal movement  Routine labor warnings were discussed and indications for L & D f/u including bleeding, regular contractions, decreased fetal movement or/and rupture of membranes.   -----------------------  PLAN:   Return in about 2 weeks (around 10/28/2021).    Carlos Nichols Sr., MD  10/14/2021 13:25 EDT

## 2021-10-15 ENCOUNTER — TELEPHONE (OUTPATIENT)
Dept: OBSTETRICS AND GYNECOLOGY | Facility: CLINIC | Age: 31
End: 2021-10-15

## 2021-10-15 NOTE — TELEPHONE ENCOUNTER
----- Message from Carlos Nichols Sr., MD sent at 10/15/2021  9:47 AM EDT -----  Please notify patient of no vaginal infection  ----- Message -----  From: Batool Duncan MA  Sent: 10/14/2021  12:57 PM EDT  To: Carlos Nichols Sr., MD

## 2021-10-28 ENCOUNTER — ROUTINE PRENATAL (OUTPATIENT)
Dept: OBSTETRICS AND GYNECOLOGY | Facility: CLINIC | Age: 31
End: 2021-10-28

## 2021-10-28 ENCOUNTER — PREP FOR SURGERY (OUTPATIENT)
Dept: OTHER | Facility: HOSPITAL | Age: 31
End: 2021-10-28

## 2021-10-28 ENCOUNTER — HOSPITAL ENCOUNTER (OUTPATIENT)
Facility: HOSPITAL | Age: 31
Discharge: HOME | End: 2021-10-28
Attending: OBSTETRICS & GYNECOLOGY | Admitting: OBSTETRICS & GYNECOLOGY

## 2021-10-28 VITALS
TEMPERATURE: 98.2 F | DIASTOLIC BLOOD PRESSURE: 55 MMHG | SYSTOLIC BLOOD PRESSURE: 101 MMHG | HEART RATE: 93 BPM | RESPIRATION RATE: 18 BRPM

## 2021-10-28 VITALS — DIASTOLIC BLOOD PRESSURE: 71 MMHG | SYSTOLIC BLOOD PRESSURE: 109 MMHG | BODY MASS INDEX: 39.14 KG/M2 | WEIGHT: 228 LBS

## 2021-10-28 DIAGNOSIS — Z86.711 HISTORY OF PULMONARY EMBOLISM: ICD-10-CM

## 2021-10-28 DIAGNOSIS — J01.00 ACUTE MAXILLARY SINUSITIS, RECURRENCE NOT SPECIFIED: ICD-10-CM

## 2021-10-28 DIAGNOSIS — Z34.80 SUPERVISION OF OTHER NORMAL PREGNANCY: Primary | ICD-10-CM

## 2021-10-28 LAB
GLUCOSE UR STRIP-MCNC: NEGATIVE MG/DL
PROT UR STRIP-MCNC: NEGATIVE MG/DL

## 2021-10-28 PROCEDURE — 59025 FETAL NON-STRESS TEST: CPT | Performed by: OBSTETRICS & GYNECOLOGY

## 2021-10-28 PROCEDURE — 59025 FETAL NON-STRESS TEST: CPT

## 2021-10-28 PROCEDURE — G0463 HOSPITAL OUTPT CLINIC VISIT: HCPCS

## 2021-10-28 PROCEDURE — 99214 OFFICE O/P EST MOD 30 MIN: CPT | Performed by: OBSTETRICS & GYNECOLOGY

## 2021-10-28 RX ORDER — CETIRIZINE HYDROCHLORIDE 5 MG/1
5 TABLET ORAL DAILY
Qty: 30 TABLET | Refills: 2 | Status: SHIPPED | OUTPATIENT
Start: 2021-10-28 | End: 2021-12-02 | Stop reason: HOSPADM

## 2021-10-28 RX ORDER — HEPARIN SODIUM 10000 [USP'U]/ML
10000 INJECTION, SOLUTION INTRAVENOUS; SUBCUTANEOUS 2 TIMES DAILY
Qty: 60 ML | Refills: 1 | Status: SHIPPED | OUTPATIENT
Start: 2021-11-04 | End: 2021-12-02 | Stop reason: HOSPADM

## 2021-10-28 NOTE — PROGRESS NOTES
Chief Complaint:  Scheduled OB visit    HPI: 31 y.o.  at 33w5d   Good baby movement  Patient has continued slight leaking vaginally, prior vaginitis panel was negative.  Patient reports MFM visit.  MFM has suggested staying on Lovenox until induction.  I feel it safer to switch to heparin for fear of spontaneous labor on Lovenox.  MFM discussed weekly ultrasounds with patient.    Patient complains of sinus pressure on the left side and headache  Vitals:    10/28/21 0937   BP: 109/71   Weight: 103 kg (228 lb)       See OB flowsheet also for pregnancy related data.  The heart rate 145    A/P  1. Intrauterine pregnancy at 33w5d   2. Pregnancy Risk:  COMPLICATED  Complicated by history of PE, on Lovenox  Switched to Heparin 10,000 units twice daily at 35 weeks      Diagnoses and all orders for this visit:    1. Supervision of other normal pregnancy (Primary)  -     POC Urinalysis Dipstick    2. History of pulmonary embolism  -     Heparin Sodium, Porcine, (heparin, porcine,) 92182 UNIT/ML injection; Inject 1 mL under the skin into the appropriate area as directed 2 (Two) Times a Day.  Dispense: 60 mL; Refill: 1    3. Acute maxillary sinusitis, recurrence not specified  -     cetirizine (zyrTEC) 5 MG tablet; Take 1 tablet by mouth Daily.  Dispense: 30 tablet; Refill: 2    Start weekly NSTs at the hospital    Continue prenatal vitamins.  Encouraged fetal kick counts, 10 movements in 2 hours every day.  To labor and delivery if lack fetal movement  Routine labor warnings were discussed and indications for L & D f/u including bleeding, regular contractions, decreased fetal movement or/and rupture of membranes.   Discussed Covid vaccination in pregnancy including CDC guidelines.  Vaccination strongly encouraged with risk of potential severe illness in unvaccinated.    -----------------------  PLAN:   No follow-ups on file.    Carlos Nichols Sr., MD  10/28/2021 10:18 EDT

## 2021-11-04 ENCOUNTER — HOSPITAL ENCOUNTER (OUTPATIENT)
Facility: HOSPITAL | Age: 31
Discharge: HOME | End: 2021-11-04
Attending: OBSTETRICS & GYNECOLOGY | Admitting: OBSTETRICS & GYNECOLOGY

## 2021-11-04 ENCOUNTER — ROUTINE PRENATAL (OUTPATIENT)
Dept: OBSTETRICS AND GYNECOLOGY | Facility: CLINIC | Age: 31
End: 2021-11-04

## 2021-11-04 ENCOUNTER — HOSPITAL ENCOUNTER (OUTPATIENT)
Dept: LABOR AND DELIVERY | Facility: HOSPITAL | Age: 31
Discharge: HOME | End: 2021-11-04

## 2021-11-04 VITALS — RESPIRATION RATE: 18 BRPM | DIASTOLIC BLOOD PRESSURE: 66 MMHG | HEART RATE: 80 BPM | SYSTOLIC BLOOD PRESSURE: 113 MMHG

## 2021-11-04 VITALS — WEIGHT: 227 LBS | BODY MASS INDEX: 38.96 KG/M2 | DIASTOLIC BLOOD PRESSURE: 78 MMHG | SYSTOLIC BLOOD PRESSURE: 133 MMHG

## 2021-11-04 DIAGNOSIS — O99.210 OBESITY IN PREGNANCY, ANTEPARTUM: ICD-10-CM

## 2021-11-04 DIAGNOSIS — Z86.711 HISTORY OF PULMONARY EMBOLISM: ICD-10-CM

## 2021-11-04 DIAGNOSIS — Z34.80 SUPERVISION OF OTHER NORMAL PREGNANCY: Primary | ICD-10-CM

## 2021-11-04 PROBLEM — B37.9 CANDIDIASIS: Status: RESOLVED | Noted: 2021-10-14 | Resolved: 2021-11-04

## 2021-11-04 LAB
GLUCOSE UR STRIP-MCNC: NEGATIVE MG/DL
PROT UR STRIP-MCNC: NEGATIVE MG/DL

## 2021-11-04 PROCEDURE — 59025 FETAL NON-STRESS TEST: CPT

## 2021-11-04 PROCEDURE — 59025 FETAL NON-STRESS TEST: CPT | Performed by: OBSTETRICS & GYNECOLOGY

## 2021-11-04 PROCEDURE — G0463 HOSPITAL OUTPT CLINIC VISIT: HCPCS

## 2021-11-04 PROCEDURE — 99214 OFFICE O/P EST MOD 30 MIN: CPT | Performed by: STUDENT IN AN ORGANIZED HEALTH CARE EDUCATION/TRAINING PROGRAM

## 2021-11-04 NOTE — PROGRESS NOTES
OB FOLLOW UP  Complaint   Chief Complaint   Patient presents with   • Follow up Prenatal Visit            Bing Poe is a 31 y.o.  34w5d patient being seen today for her obstetrical follow up visit. Patient denies decreased fetal movement, contractions, loss of fluid or vaginal bleeding.  No other acute complaints has been compliant with Lovenox    Her prenatal care is complicated by (and status) :    Patient Active Problem List   Diagnosis   • Supervision of other normal pregnancy   • Obesity in pregnancy, antepartum   • History of pulmonary embolism   • Acute maxillary sinusitis       All other systems reviewed and are negative.     The additional following portions of the patient's history were reviewed and updated as appropriate: allergies, current medications, past family history, past medical history, past social history, past surgical history and problem list.      EXAM:     Vital signs: /78   Wt 103 kg (227 lb)   LMP 2021   BMI 38.96 kg/m²   Appearance/psychiatric: To be in no distress  Constitutional: The patient is well nourished.  Cardiovascular: She does not have edema.  Respiratory: Respiratory effort is normal.  Gastrointestinal: Abdomen is soft, gravid, nontender, no rashes, heart tones are present, fundal height is size equals dates    Pelvic Exam: No    Urine glucose/protein: See prenatal flowsheet       Assessment and Plan    Problem List Items Addressed This Visit        Coag and Thromboembolic    History of pulmonary embolism    Relevant Medications    Heparin Sodium, Porcine, (heparin, porcine,) 90954 UNIT/ML injection       Gravid and     Supervision of other normal pregnancy - Primary    Overview     Initial visit:   wnl  • PNL:  • PAP/GC/CT/Urine culture:  • Blood type: O+  • Hx of HSV: No     Genetic testing:    • NIPS - wnl    Vaccinations:  • COVID: recommended 2021   • Influenza: recommended 2021     24-28 weeks:  • 1hr  GCT:  • Hct/Plt:  • Tdap Rx  • Rhogam indicated:      Third Trimester:  • GBS  • Breast pump:    Ultrasound:  • Dating:   • Anatomy:   • Follow up:     PLAN:               Relevant Orders    POC Urinalysis Dipstick (Completed)    Obesity in pregnancy, antepartum          Impression  1. Pregnancy at 34w5d  2. Fetal status reassuring.   3. Activity and Exercise discussed.  4. History of PE    Plan  1.  Continue routine prenatal care.  Follow-up 1 week  2.  Transition from Lovenox to heparin this week.      Patient was counseled to the following pregnancy precautions:  • Decreased fetal movement, if concern for decreased fetal movement please perform fetal kick counts you are looking for 10 movements in 2 hours.  If concern for fetal movement and not meeting that criteria, please present to triage for evaluation.  • Contractions occurring every 5 minutes for over an hour, lasting 30 to 60 seconds and progressively causing more discomfort, please seek medical attention to rule out labor  • If you believe that your water is broken, place a sanitary pad.  If pad fills in short period of time i.e. less than 5 minutes, take off pad placed another pad.  If this is saturated please present for rule out rupture of membranes  • Vaginal bleeding can be normal in pregnancy, this usually takes a form of spotting.  If having heavier bleeding like a menstrual period please present for evaluation; especially in light of severe abdominal pain this could represent a placental abruption.  • Keep all scheduled appointments as recommended.        Uriah Montejo MD  11/04/2021

## 2021-11-04 NOTE — SIGNIFICANT NOTE
11/04/21 1024   Nonstress Test   Reason for NST OB Triage  (NST HX OF DVT)   Acoustic Stimulator No   Uterine Irritability No   Contractions Not present   Fetal Assessment   Fetal Movement active   Fetal HR Assessment Method external   Fetal HR (beats/min) 124   Fetal Heart Baseline Rate normal range   Fetal HR Variability moderate (amplitude range 6 to 25 bpm)   Fetal HR Accelerations lasting at least 15 seconds   Fetal HR Decelerations absent   Interpretation A   Nonstress Test Interpretation A Reactive   34w5d  /66 (BP Location: Left arm, Patient Position: Lying)   Pulse 80   Resp 18   LMP 03/06/2021   Reason for test: (P) OB Triage (NST HX OF DVT)  Date of Test: 11/4/2021  Time frame of test: 8421-0769  RN NST Interpretation: (P) Reactive

## 2021-11-05 NOTE — NON STRESS TEST
[facility]   OB NST Note    2021   Name:  Bing Poe  MRN: [id number]    Subjective:  31 y.o.  at 34w6d    Indication: History of pulmonary embolism    NST:   Baseline: 135  Variability:   Moderate/Normal (amplitude 6-25 bpm)  Accelerations: Present (32 weeks+) 15 x 15 bpm  Decelerations: Absent   Contractions:  Absent    NST interpretation: reactive    Documented Vitals    21 1014   BP: 113/66   Pulse: 80   Resp: 18         Lab Results (last 24 hours)     Procedure Component Value Units Date/Time    POC Urinalysis Dipstick [[id number]] Collected: 21    Specimen: Urine Updated: 21 0910     Glucose, UA Negative mg/dL      Protein, POC Negative mg/dL            Cervical Exam:    Assessment:  31 y.o.  AT 34w6d  History of pulmonary embolism    Plan:   OB Precautions, HTN Precautions, FKC, Keep scheduled NSTs, Keep office visit      Electronically signed by Devaughn Perez MD, 21, 8:40 AM EDT.

## 2021-11-08 ENCOUNTER — HOSPITAL ENCOUNTER (OUTPATIENT)
Facility: HOSPITAL | Age: 31
End: 2021-11-08
Attending: STUDENT IN AN ORGANIZED HEALTH CARE EDUCATION/TRAINING PROGRAM | Admitting: STUDENT IN AN ORGANIZED HEALTH CARE EDUCATION/TRAINING PROGRAM

## 2021-11-08 ENCOUNTER — TELEPHONE (OUTPATIENT)
Dept: OBSTETRICS AND GYNECOLOGY | Facility: CLINIC | Age: 31
End: 2021-11-08

## 2021-11-08 ENCOUNTER — HOSPITAL ENCOUNTER (OUTPATIENT)
Facility: HOSPITAL | Age: 31
End: 2021-11-08

## 2021-11-08 ENCOUNTER — HOSPITAL ENCOUNTER (OUTPATIENT)
Facility: HOSPITAL | Age: 31
Discharge: HOME | End: 2021-11-08
Attending: STUDENT IN AN ORGANIZED HEALTH CARE EDUCATION/TRAINING PROGRAM | Admitting: STUDENT IN AN ORGANIZED HEALTH CARE EDUCATION/TRAINING PROGRAM

## 2021-11-08 VITALS
RESPIRATION RATE: 16 BRPM | TEMPERATURE: 97.8 F | HEART RATE: 97 BPM | SYSTOLIC BLOOD PRESSURE: 121 MMHG | DIASTOLIC BLOOD PRESSURE: 73 MMHG

## 2021-11-08 PROBLEM — Z86.711 HISTORY OF PULMONARY EMBOLISM: Status: RESOLVED | Noted: 2021-09-28 | Resolved: 2021-11-08

## 2021-11-08 PROBLEM — Z86.711 HISTORY OF PULMONARY EMBOLISM: Status: ACTIVE | Noted: 2021-11-08

## 2021-11-08 LAB
DEPRECATED RDW RBC AUTO: 38.2 FL (ref 37–54)
ERYTHROCYTE [DISTWIDTH] IN BLOOD BY AUTOMATED COUNT: 12.7 % (ref 12.3–15.4)
HCT VFR BLD AUTO: 34 % (ref 34–46.6)
HGB BLD-MCNC: 11.3 G/DL (ref 12–15.9)
MCH RBC QN AUTO: 27.4 PG (ref 26.6–33)
MCHC RBC AUTO-ENTMCNC: 33.2 G/DL (ref 31.5–35.7)
MCV RBC AUTO: 82.5 FL (ref 79–97)
PLATELET # BLD AUTO: 326 10*3/MM3 (ref 140–450)
PMV BLD AUTO: 9.9 FL (ref 6–12)
RBC # BLD AUTO: 4.12 10*6/MM3 (ref 3.77–5.28)
WBC # BLD AUTO: 12.8 10*3/MM3 (ref 3.4–10.8)

## 2021-11-08 PROCEDURE — G0463 HOSPITAL OUTPT CLINIC VISIT: HCPCS

## 2021-11-08 PROCEDURE — 59025 FETAL NON-STRESS TEST: CPT

## 2021-11-08 PROCEDURE — 59025 FETAL NON-STRESS TEST: CPT | Performed by: STUDENT IN AN ORGANIZED HEALTH CARE EDUCATION/TRAINING PROGRAM

## 2021-11-08 PROCEDURE — 99213 OFFICE O/P EST LOW 20 MIN: CPT | Performed by: STUDENT IN AN ORGANIZED HEALTH CARE EDUCATION/TRAINING PROGRAM

## 2021-11-08 PROCEDURE — 85027 COMPLETE CBC AUTOMATED: CPT | Performed by: STUDENT IN AN ORGANIZED HEALTH CARE EDUCATION/TRAINING PROGRAM

## 2021-11-08 RX ORDER — FAMOTIDINE 20 MG/1
20 TABLET, FILM COATED ORAL
Status: DISCONTINUED | OUTPATIENT
Start: 2021-11-08 | End: 2021-11-08 | Stop reason: HOSPADM

## 2021-11-08 RX ADMIN — FAMOTIDINE 20 MG: 20 TABLET ORAL at 15:28

## 2021-11-08 NOTE — SIGNIFICANT NOTE
11/08/21 1550   Nonstress Test   Reason for NST OB Triage  (allergic reaction to heparin)   Acoustic Stimulator No   Uterine Irritability No   Contractions Irregular   Fetal Assessment   Fetal Movement active   Fetal HR Assessment Method external   Fetal HR (beats/min) 135   Fetal Heart Baseline Rate normal range   Fetal HR Variability moderate (amplitude range 6 to 25 bpm)   Fetal HR Accelerations greater than/equal to 15 bpm; lasting at least 15 seconds   Fetal HR Decelerations absent   Interpretation A   Nonstress Test Interpretation A Reactive       /73 (BP Location: Right arm, Patient Position: Sitting)   Pulse 97   Temp 97.8 °F (36.6 °C) (Oral)   Resp 16   LMP 03/06/2021     Reason for test: (P) OB Triage (allergic reaction to heparin)  Date of Test: 11/8/2021  Time frame of test: 7973-8748  RN NST Interpretation: (P) Reactive    35w2d

## 2021-11-08 NOTE — TELEPHONE ENCOUNTER
"Patient states she started her heparin prescription yesterday morning. After her AM does she had some itching all over. After her PM does yesterday she had all over itching again with some hives on her neck and face. She states this AM her throat feels \"funny\". She has not taken her AM dose today yet. I advised patient to hold off on this mornings dose until I get further recommendations and to start taking Benadryl. Any further recommendations or medication changes? Please advise.  "

## 2021-11-08 NOTE — NURSING NOTE
Updated dr stewart that patient presented to triage after being told to come to l/d for evaluation due to allergic reaction from heparin shots. Patient states itching, hives and tightness in throat after last heparin shot yesterday. Patient did take benadryl. Dr stewart ordered nst and cbc.

## 2021-11-08 NOTE — PROGRESS NOTES
OB TRIAGE NOTE        Name:  Bing Poe  MRN: [id number]    31 y.o. female  at 35w2d    Chief Complaint: Reaction to medication    Subjective:Bing Poe is a 31 y.o.  35w2d who presents for evaluation for allergic reaction to newly instituted heparin.  Patient reports that she transitioned over from Lovenox to heparin yesterday morning.  After that first injection she noted that she had itching on her arms and on her abdomen.  Following their second dose last night she reports that she felt that she had hives on her neck.  She called the office this morning with the concerns of a medication reaction and was told to take Benadryl and present to triage for evaluation this afternoon.  She she feels that her breathing is at baseline and that earlier it felt like it was more mucousy then concerns for her throat swelling up.  She reports good fetal movement, no contractions, no vaginal bleeding or loss of fluid.  Review of history patient was on a heparin drip in 2018 and then transition to Xarelto for 6 months following her PE that was felt to be provoked by a long car trip as well as the recent initiation of oral contraceptive pills for PCOS.     ROS: No leaking fluid, No vaginal bleeding, No contractions, Adequate FM, No HA, No scotomata or vision changes, No RUQ/epigastric pain, No swelling, No fever/chills, No nausea, No vomiting and No abdominal pain    Objective:    Physical Exam  Constitutional:       Appearance: Normal appearance. She is obese.   HENT:      Head: Atraumatic.   Eyes:      Extraocular Movements: Extraocular movements intact.   Cardiovascular:      Rate and Rhythm: Normal rate and regular rhythm.      Pulses: Normal pulses.   Pulmonary:      Effort: Pulmonary effort is normal.   Musculoskeletal:      Cervical back: Normal range of motion.   Neurological:      Mental Status: She is alert and oriented to person, place, and time.   Skin:     General:  Skin is warm and dry.   Psychiatric:         Mood and Affect: Mood normal.         Behavior: Behavior normal.         Thought Content: Thought content normal.         Judgment: Judgment normal.   Vitals and nursing note reviewed.         Baseline: 135  Variability:   Moderate/Normal (amplitude 6-25 bpm)  Accelerations: Present (32 weeks+) 15 x 15 bpm  Decelerations: None  Contractions:  Irregular    Cervical exam: Deferred    Lab Results   Component Value Date    WBC 12.80 (H) 11/08/2021    HGB 11.3 (L) 11/08/2021    HCT 34.0 11/08/2021    MCV 82.5 11/08/2021     11/08/2021      Lab Results   Component Value Date    GLUCOSE 101 (H) 05/21/2021    BUN 8 05/21/2021    CREATININE 0.62 05/21/2021    BCR 13 05/21/2021    K 3.8 05/21/2021    CO2 23 05/21/2021    CALCIUM 9.0 05/21/2021    ALBUMIN 3.5 05/21/2021    LABIL2 0.9 (L) 05/21/2021    AST 19 05/21/2021    ALT 14 05/21/2021        Impression:  Reactive NST   Patient Active Problem List    Diagnosis    • History of pulmonary embolism [Z86.711]    • Acute maxillary sinusitis [J01.00]    • Supervision of other normal pregnancy [Z34.80]    • Obesity in pregnancy, antepartum [O99.210]       Review of literature with systemic allergy to heparin extremely rare.  Physical examination with no skin manifestations for concerns.  Work of breathing normal.  Called and discussed case with pharmacy after ruling out heparin-induced thrombocytopenia.  Other agents would have cross-reactivity.  Patient was on a heparin drip, do not suspect anaphylaxis and therefore recommend continuation of heparin for remainder of pregnancy as benefits outweigh risks.  Pretreat with Benadryl prior to injecting.     Plan: OB Precautions, Keep office visit      Electronically signed by Uriah Montejo MD, 11/08/21, 3:23 PM EST.

## 2021-11-11 ENCOUNTER — ROUTINE PRENATAL (OUTPATIENT)
Dept: OBSTETRICS AND GYNECOLOGY | Facility: CLINIC | Age: 31
End: 2021-11-11

## 2021-11-11 VITALS — WEIGHT: 230 LBS | BODY MASS INDEX: 39.48 KG/M2 | DIASTOLIC BLOOD PRESSURE: 74 MMHG | SYSTOLIC BLOOD PRESSURE: 109 MMHG

## 2021-11-11 DIAGNOSIS — Z34.80 SUPERVISION OF OTHER NORMAL PREGNANCY: Primary | ICD-10-CM

## 2021-11-11 DIAGNOSIS — Z86.711 HISTORY OF PULMONARY EMBOLISM: ICD-10-CM

## 2021-11-11 LAB
GLUCOSE UR STRIP-MCNC: NEGATIVE MG/DL
PROT UR STRIP-MCNC: NEGATIVE MG/DL

## 2021-11-11 PROCEDURE — 99213 OFFICE O/P EST LOW 20 MIN: CPT | Performed by: STUDENT IN AN ORGANIZED HEALTH CARE EDUCATION/TRAINING PROGRAM

## 2021-11-11 PROCEDURE — 87081 CULTURE SCREEN ONLY: CPT | Performed by: STUDENT IN AN ORGANIZED HEALTH CARE EDUCATION/TRAINING PROGRAM

## 2021-11-11 NOTE — PROGRESS NOTES
OB FOLLOW UP  Complaint   Chief Complaint   Patient presents with   • Routine Prenatal Visit            Bing Poe is a 31 y.o.  35w5d patient being seen today for her obstetrical follow up visit. Patient denies decreased fetal movement, contractions, loss of fluid or vaginal bleeding.  Noticing some swelling in her left arm and leg.  Improves with rest. Patient denies any headache, visual disturbances, new onset nausea vomiting, right upper quadrant pain, or new onset swelling.      Her prenatal care is complicated by (and status) :    Patient Active Problem List   Diagnosis   • Supervision of other normal pregnancy   • Obesity in pregnancy, antepartum   • Acute maxillary sinusitis   • History of pulmonary embolism       All other systems reviewed and are negative.     The additional following portions of the patient's history were reviewed and updated as appropriate: allergies, current medications, past family history, past medical history, past social history, past surgical history and problem list.      EXAM:     Vital signs: /74   Wt 104 kg (230 lb)   LMP 2021   BMI 39.48 kg/m²   Appearance/psychiatric: To be in no distress  Constitutional: The patient is well nourished.  Cardiovascular: She does not have edema.  Respiratory: Respiratory effort is normal.  Gastrointestinal: Abdomen is soft, gravid, nontender, no rashes, heart tones are present, fundal height is size equals dates    Pelvic Exam: Yes.  Presentation: cephalic. Dilation: Closed. Effacement: 50%. Station: -3.    Urine glucose/protein: See prenatal flowsheet       Assessment and Plan    Problem List Items Addressed This Visit        Coag and Thromboembolic    History of pulmonary embolism    Overview     2021 presented to triage for complaints of allergic reaction to heparin.  Review of literature with systemic allergy to heparin extremely rare.  Physical examination with no skin manifestations for  concerns.  Work of breathing normal.  Called and discussed case with pharmacy after ruling out heparin-induced thrombocytopenia.  Other agents would have cross-reactivity.  Patient was on a heparin drip, do not suspect anaphylaxis and therefore recommend continuation of heparin for remainder of pregnancy as benefits outweigh risks.  Pretreat with Benadryl prior to injecting         Relevant Medications    Heparin Sodium, Porcine, (heparin, porcine,) 64518 UNIT/ML injection       Gravid and     Supervision of other normal pregnancy - Primary    Overview     Initial visit:   wnl  • PNL:  • PAP/GC/CT/Urine culture:  • Blood type: O+  • Hx of HSV: No     Genetic testing:    • NIPS - wnl    Vaccinations:  • COVID: recommended 2021   • Influenza: recommended 2021     24-28 weeks:  • 1hr GCT:  • Hct/Plt:  • Tdap Rx  • Rhogam indicated:      Third Trimester:  • GBS  • Breast pump:    Ultrasound:  • Dating:   • Anatomy:   • Follow up:     PLAN:               Relevant Orders    Group B Streptococcus Culture - Swab, Vaginal/Rectum    POC Urinalysis Dipstick (Completed)          Impression  1. Pregnancy at 35w5d  2. Fetal status reassuring.   3. Activity and Exercise discussed.    Plan  1.  Follow-up 1 week      Patient was counseled to the following pregnancy precautions:  • Decreased fetal movement, if concern for decreased fetal movement please perform fetal kick counts you are looking for 10 movements in 2 hours.  If concern for fetal movement and not meeting that criteria, please present to triage for evaluation.  • Contractions occurring every 5 minutes for over an hour, lasting 30 to 60 seconds and progressively causing more discomfort, please seek medical attention to rule out labor  • If you believe that your water is broken, place a sanitary pad.  If pad fills in short period of time i.e. less than 5 minutes, take off pad placed another pad.  If this is saturated please present for rule out rupture  of membranes  • Vaginal bleeding can be normal in pregnancy, this usually takes a form of spotting.  If having heavier bleeding like a menstrual period please present for evaluation; especially in light of severe abdominal pain this could represent a placental abruption.  • Keep all scheduled appointments as recommended.        Uriah Montejo MD  11/11/2021

## 2021-11-15 LAB — BACTERIA SPEC AEROBE CULT: NORMAL

## 2021-11-17 ENCOUNTER — ROUTINE PRENATAL (OUTPATIENT)
Dept: OBSTETRICS AND GYNECOLOGY | Facility: CLINIC | Age: 31
End: 2021-11-17

## 2021-11-17 VITALS — SYSTOLIC BLOOD PRESSURE: 130 MMHG | BODY MASS INDEX: 38.79 KG/M2 | WEIGHT: 226 LBS | DIASTOLIC BLOOD PRESSURE: 80 MMHG

## 2021-11-17 DIAGNOSIS — Z86.711 HISTORY OF PULMONARY EMBOLISM: ICD-10-CM

## 2021-11-17 DIAGNOSIS — O99.210 OBESITY IN PREGNANCY, ANTEPARTUM: ICD-10-CM

## 2021-11-17 DIAGNOSIS — Z34.80 SUPERVISION OF OTHER NORMAL PREGNANCY: Primary | ICD-10-CM

## 2021-11-17 LAB
GLUCOSE UR STRIP-MCNC: NEGATIVE MG/DL
PROT UR STRIP-MCNC: NEGATIVE MG/DL

## 2021-11-17 PROCEDURE — 99214 OFFICE O/P EST MOD 30 MIN: CPT | Performed by: STUDENT IN AN ORGANIZED HEALTH CARE EDUCATION/TRAINING PROGRAM

## 2021-11-17 NOTE — PROGRESS NOTES
OB FOLLOW UP  Complaint   Chief Complaint   Patient presents with   • Routine Prenatal Visit            Bing Poe is a 31 y.o.  36w4d patient being seen today for her obstetrical follow up visit. Patient denies decreased fetal movement, contractions, loss of fluid or vaginal bleeding.  Has noted some increased swelling in left leg, does improve with rest.  Denies any pain, shortness of breath or palpitations.     Her prenatal care is complicated by (and status) :    Patient Active Problem List   Diagnosis   • Supervision of other normal pregnancy   • Obesity in pregnancy, antepartum   • Acute maxillary sinusitis   • History of pulmonary embolism       All other systems reviewed and are negative.     The additional following portions of the patient's history were reviewed and updated as appropriate: allergies, current medications, past family history, past medical history, past social history, past surgical history and problem list.      EXAM:     Vital signs: /80   Wt 103 kg (226 lb)   LMP 2021   BMI 38.79 kg/m²   Appearance/psychiatric: To be in no distress  Constitutional: The patient is well nourished.  Cardiovascular: She does not have edema.  Respiratory: Respiratory effort is normal.  Gastrointestinal: Abdomen is soft, gravid, nontender, no rashes, heart tones are present, fundal height is size equals dates    Pelvic Exam: No    Urine glucose/protein: See prenatal flowsheet       Assessment and Plan    Problem List Items Addressed This Visit        Coag and Thromboembolic    History of pulmonary embolism    Overview     2021 presented to triage for complaints of allergic reaction to heparin.  Review of literature with systemic allergy to heparin extremely rare.  Physical examination with no skin manifestations for concerns.  Work of breathing normal.  Called and discussed case with pharmacy after ruling out heparin-induced thrombocytopenia.  Other agents would have  cross-reactivity.  Patient was on a heparin drip, do not suspect anaphylaxis and therefore recommend continuation of heparin for remainder of pregnancy as benefits outweigh risks.  Pretreat with Benadryl prior to injecting    2021 discussion regarding heparin and continuation until 24 hours prior to induction of labor.  Discussed with patient that depending on route of delivery she will need to restart anticoagulation 6 to 12 hours after delivery and continue for at least 6 weeks postpartum.         Relevant Medications    Heparin Sodium, Porcine, (heparin, porcine,) 69888 UNIT/ML injection       Gravid and     Supervision of other normal pregnancy - Primary    Overview     Initial visit:   wnl  • PNL:  • PAP/GC/CT/Urine culture:  • Blood type: O+  • Hx of HSV: No     Genetic testing:    • NIPS - wnl    Vaccinations:  • COVID: recommended 2021   • Influenza: recommended 2021     24-28 weeks:  • 1hr GCT: wnl  • Hct/Plt: wnl   • Tdap Rx    Third Trimester:  • GBS -negative  • Breast pump:    Ultrasound:  • Dating:   • Anatomy:   • Follow up:     PLAN:               Relevant Orders    POC Urinalysis Dipstick (Completed)    Obesity in pregnancy, antepartum          Impression  1. Pregnancy at 36w4d  2. Fetal status reassuring.   3. Activity and Exercise discussed.    Plan  1.  Continue heparin prophylaxis  2.  Follow-up 1 week      Patient was counseled to the following pregnancy precautions:  • Decreased fetal movement, if concern for decreased fetal movement please perform fetal kick counts you are looking for 10 movements in 2 hours.  If concern for fetal movement and not meeting that criteria, please present to triage for evaluation.  • Contractions occurring every 5 minutes for over an hour, lasting 30 to 60 seconds and progressively causing more discomfort, please seek medical attention to rule out labor  • If you believe that your water is broken, place a sanitary pad.  If pad fills in  short period of time i.e. less than 5 minutes, take off pad placed another pad.  If this is saturated please present for rule out rupture of membranes  • Vaginal bleeding can be normal in pregnancy, this usually takes a form of spotting.  If having heavier bleeding like a menstrual period please present for evaluation; especially in light of severe abdominal pain this could represent a placental abruption.  • Keep all scheduled appointments as recommended.        Uriah Montejo MD  11/17/2021

## 2021-11-22 ENCOUNTER — ROUTINE PRENATAL (OUTPATIENT)
Dept: OBSTETRICS AND GYNECOLOGY | Facility: CLINIC | Age: 31
End: 2021-11-22

## 2021-11-22 VITALS — DIASTOLIC BLOOD PRESSURE: 79 MMHG | BODY MASS INDEX: 38.62 KG/M2 | SYSTOLIC BLOOD PRESSURE: 126 MMHG | WEIGHT: 225 LBS

## 2021-11-22 DIAGNOSIS — D68.62 LUPUS ANTICOAGULANT DISORDER (HCC): ICD-10-CM

## 2021-11-22 DIAGNOSIS — O99.210 OBESITY IN PREGNANCY, ANTEPARTUM: ICD-10-CM

## 2021-11-22 DIAGNOSIS — J45.20 MILD INTERMITTENT ASTHMA WITHOUT COMPLICATION: ICD-10-CM

## 2021-11-22 DIAGNOSIS — Z86.711 HISTORY OF PULMONARY EMBOLISM: ICD-10-CM

## 2021-11-22 DIAGNOSIS — Z34.80 SUPERVISION OF OTHER NORMAL PREGNANCY: Primary | ICD-10-CM

## 2021-11-22 PROBLEM — F90.9 ADHD: Status: ACTIVE | Noted: 2021-11-22

## 2021-11-22 PROBLEM — J01.00 ACUTE MAXILLARY SINUSITIS: Status: RESOLVED | Noted: 2021-10-28 | Resolved: 2021-11-22

## 2021-11-22 PROBLEM — J45.909 ASTHMA: Status: ACTIVE | Noted: 2021-11-22

## 2021-11-22 LAB
GLUCOSE UR STRIP-MCNC: NEGATIVE MG/DL
PROT UR STRIP-MCNC: NEGATIVE MG/DL

## 2021-11-22 PROCEDURE — 99214 OFFICE O/P EST MOD 30 MIN: CPT | Performed by: OBSTETRICS & GYNECOLOGY

## 2021-11-22 NOTE — PROGRESS NOTES
OB FOLLOW UP    CC: Scheduled OB routine FU       Prenatal care complicated by:   Patient Active Problem List   Diagnosis   • Supervision of other normal pregnancy   • Obesity in pregnancy, antepartum   • History of pulmonary embolism   • Asthma   • Lupus anticoagulant disorder (HCC)   • ADHD       Subjective:   Patient has: No complaints, No leaking fluid, No vaginal bleeding, No contractions, Adequate FM  Reports some low back pain    Objective:  Urine glucose/protein- see flow sheet      /79   Wt 102 kg (225 lb)   LMP 03/06/2021   BMI 38.62 kg/m²   See OB flow for LE edema, and cvx exam if applicable  FHT: 142 BPM   Uterine Size: 38 cm      Assessment and Plan:  Diagnoses and all orders for this visit:    1. Supervision of other normal pregnancy (Primary)  Overview:  Obesity  History of pulmonary embolism  Positive lupus anticoagulant  Asthma    Genetic testing:    • NIPS - wnl    Vaccinations:  • COVID: recommended 11/4/2021   • Influenza: recommended 11/4/2021     Patient reports she is scheduled for induction on 12/3/2021      Assessment & Plan:  Continue prenatal vitamins  Kick counts  Labor instructions  GBS negative  Patient has not received flu or Tdap vaccines.  Discussed the importance of these vaccines.  Urged to get ASAP.  Vaccine prescriptions given today.    Orders:  -     POC Urinalysis Dipstick    2. History of pulmonary embolism  Overview:  11/8/2021 presented to triage for complaints of allergic reaction to heparin.  Review of literature with systemic allergy to heparin extremely rare.  Physical examination with no skin manifestations for concerns.  Work of breathing normal.  Called and discussed case with pharmacy after ruling out heparin-induced thrombocytopenia.  Other agents would have cross-reactivity.  Patient was on a heparin drip, do not suspect anaphylaxis and therefore recommend continuation of heparin for remainder of pregnancy as benefits outweigh risks.  Pretreat with  Benadryl prior to injecting    11/17/2021 discussion regarding heparin and continuation until 24 hours prior to induction of labor.  Discussed with patient that depending on route of delivery she will need to restart anticoagulation 6 to 12 hours after delivery and continue for at least 6 weeks postpartum.    Assessment & Plan:  Continue heparin 10,000 units twice daily  Discussed if the patient is having labor symptoms and due for a dose of medication she should hold this until she feels comfortable she is not in labor or until she has been evaluated in the labor and delivery.      3. Obesity in pregnancy, antepartum  Assessment & Plan:  Discussed nutrition, exercise and appropriate weight gain in pregnancy.      4. Lupus anticoagulant disorder (HCC)    5. Mild intermittent asthma without complication  Overview:  No meds    Assessment & Plan:  Denies any symptoms, or recent exacerbations              37w2d  Reassuring pregnancy progress    Counseling: OB precautions, leaking, VB, lamar wallace vs PTL/Labor  FKC    Questions answered    Return in about 1 week (around 11/29/2021) for Recheck.      Devaughn Perez MD  11/22/2021

## 2021-11-22 NOTE — ASSESSMENT & PLAN NOTE
Continue heparin 10,000 units twice daily  Discussed if the patient is having labor symptoms and due for a dose of medication she should hold this until she feels comfortable she is not in labor or until she has been evaluated in the labor and delivery.

## 2021-11-24 ENCOUNTER — TRANSCRIBE ORDERS (OUTPATIENT)
Dept: LAB | Facility: HOSPITAL | Age: 31
End: 2021-11-24

## 2021-11-24 DIAGNOSIS — Z01.818 PREOP TESTING: Primary | ICD-10-CM

## 2021-11-29 ENCOUNTER — LAB (OUTPATIENT)
Dept: LAB | Facility: HOSPITAL | Age: 31
End: 2021-11-29

## 2021-11-29 DIAGNOSIS — Z01.818 PREOP TESTING: ICD-10-CM

## 2021-11-29 PROCEDURE — C9803 HOPD COVID-19 SPEC COLLECT: HCPCS

## 2021-11-29 PROCEDURE — 87635 SARS-COV-2 COVID-19 AMP PRB: CPT

## 2021-11-30 LAB — SARS-COV-2 N GENE RESP QL NAA+PROBE: NOT DETECTED

## 2021-12-01 ENCOUNTER — HOSPITAL ENCOUNTER (OUTPATIENT)
Facility: HOSPITAL | Age: 31
Discharge: HOME | End: 2021-12-01
Attending: OBSTETRICS & GYNECOLOGY | Admitting: OBSTETRICS & GYNECOLOGY

## 2021-12-01 ENCOUNTER — HOSPITAL ENCOUNTER (INPATIENT)
Facility: HOSPITAL | Age: 31
LOS: 1 days | Discharge: HOME | End: 2021-12-02
Attending: OBSTETRICS & GYNECOLOGY | Admitting: OBSTETRICS & GYNECOLOGY

## 2021-12-01 VITALS
DIASTOLIC BLOOD PRESSURE: 74 MMHG | RESPIRATION RATE: 18 BRPM | HEART RATE: 93 BPM | SYSTOLIC BLOOD PRESSURE: 134 MMHG | TEMPERATURE: 98.5 F

## 2021-12-01 LAB
A1 MICROGLOB PLACENTAL VAG QL: NEGATIVE
ABO GROUP BLD: NORMAL
ABO GROUP BLD: NORMAL
ALBUMIN SERPL-MCNC: 3.5 G/DL (ref 3.5–5.2)
ALBUMIN/GLOB SERPL: 1 G/DL
ALP SERPL-CCNC: 154 U/L (ref 39–117)
ALT SERPL W P-5'-P-CCNC: 6 U/L (ref 1–33)
ANION GAP SERPL CALCULATED.3IONS-SCNC: 16.6 MMOL/L (ref 5–15)
APTT PPP: 23 SECONDS (ref 22.2–34.2)
AST SERPL-CCNC: 11 U/L (ref 1–32)
BASE EXCESS BLDCOA CALC-SCNC: -3.4 MMOL/L
BASE EXCESS BLDCOV CALC-SCNC: -3 MMOL/L
BILIRUB SERPL-MCNC: 0.3 MG/DL (ref 0–1.2)
BLD GP AB SCN SERPL QL: NEGATIVE
BUN SERPL-MCNC: 8 MG/DL (ref 6–20)
BUN/CREAT SERPL: 10 (ref 7–25)
CALCIUM SPEC-SCNC: 9.3 MG/DL (ref 8.6–10.5)
CHLORIDE SERPL-SCNC: 98 MMOL/L (ref 98–107)
CO2 SERPL-SCNC: 16.4 MMOL/L (ref 22–29)
CREAT SERPL-MCNC: 0.8 MG/DL (ref 0.57–1)
DEPRECATED RDW RBC AUTO: 39.6 FL (ref 37–54)
ERYTHROCYTE [DISTWIDTH] IN BLOOD BY AUTOMATED COUNT: 13.8 % (ref 12.3–15.4)
FIBRINOGEN PPP-MCNC: 572 MG/DL (ref 200–450)
GFR SERPL CREATININE-BSD FRML MDRD: 101 ML/MIN/1.73
GFR SERPL CREATININE-BSD FRML MDRD: 84 ML/MIN/1.73
GLOBULIN UR ELPH-MCNC: 3.6 GM/DL
GLUCOSE SERPL-MCNC: 115 MG/DL (ref 65–99)
HCO3 BLDCOA-SCNC: 23 MMOL/L
HCO3 BLDCOV-SCNC: 23.4 MMOL/L
HCT VFR BLD AUTO: 37.3 % (ref 34–46.6)
HGB BLD-MCNC: 12.8 G/DL (ref 12–15.9)
INR PPP: 0.89 (ref 2–3)
MCH RBC QN AUTO: 27.6 PG (ref 26.6–33)
MCHC RBC AUTO-ENTMCNC: 34.3 G/DL (ref 31.5–35.7)
MCV RBC AUTO: 80.6 FL (ref 79–97)
PCO2 BLDCOA: 45.9 MMHG (ref 33–49)
PCO2 BLDCOV: 46.8 MM HG (ref 28–40)
PH BLDCOA: 7.32 PH UNITS (ref 7.21–7.31)
PH BLDCOV: 7.32 PH UNITS (ref 7.31–7.37)
PLATELET # BLD AUTO: 364 10*3/MM3 (ref 140–450)
PMV BLD AUTO: 11.2 FL (ref 6–12)
PO2 BLDCOA: <40.5 MMHG
PO2 BLDCOV: <40.5 MM HG (ref 21–31)
POTASSIUM SERPL-SCNC: 3.5 MMOL/L (ref 3.5–5.2)
PROT SERPL-MCNC: 7.1 G/DL (ref 6–8.5)
PROTHROMBIN TIME: 9.6 SECONDS (ref 9.4–12)
RBC # BLD AUTO: 4.63 10*6/MM3 (ref 3.77–5.28)
RH BLD: POSITIVE
RH BLD: POSITIVE
SODIUM SERPL-SCNC: 131 MMOL/L (ref 136–145)
T&S EXPIRATION DATE: NORMAL
WBC NRBC COR # BLD: 15.38 10*3/MM3 (ref 3.4–10.8)

## 2021-12-01 PROCEDURE — 86901 BLOOD TYPING SEROLOGIC RH(D): CPT

## 2021-12-01 PROCEDURE — S0260 H&P FOR SURGERY: HCPCS | Performed by: OBSTETRICS & GYNECOLOGY

## 2021-12-01 PROCEDURE — 86900 BLOOD TYPING SEROLOGIC ABO: CPT | Performed by: OBSTETRICS & GYNECOLOGY

## 2021-12-01 PROCEDURE — 86900 BLOOD TYPING SEROLOGIC ABO: CPT

## 2021-12-01 PROCEDURE — 86850 RBC ANTIBODY SCREEN: CPT | Performed by: OBSTETRICS & GYNECOLOGY

## 2021-12-01 PROCEDURE — 85384 FIBRINOGEN ACTIVITY: CPT | Performed by: OBSTETRICS & GYNECOLOGY

## 2021-12-01 PROCEDURE — 85027 COMPLETE CBC AUTOMATED: CPT | Performed by: OBSTETRICS & GYNECOLOGY

## 2021-12-01 PROCEDURE — 59410 OBSTETRICAL CARE: CPT | Performed by: OBSTETRICS & GYNECOLOGY

## 2021-12-01 PROCEDURE — 59025 FETAL NON-STRESS TEST: CPT | Performed by: OBSTETRICS & GYNECOLOGY

## 2021-12-01 PROCEDURE — 85610 PROTHROMBIN TIME: CPT | Performed by: OBSTETRICS & GYNECOLOGY

## 2021-12-01 PROCEDURE — 25010000002 ENOXAPARIN PER 10 MG: Performed by: OBSTETRICS & GYNECOLOGY

## 2021-12-01 PROCEDURE — 85730 THROMBOPLASTIN TIME PARTIAL: CPT | Performed by: OBSTETRICS & GYNECOLOGY

## 2021-12-01 PROCEDURE — 59020 FETAL CONTRACT STRESS TEST: CPT

## 2021-12-01 PROCEDURE — G0463 HOSPITAL OUTPT CLINIC VISIT: HCPCS

## 2021-12-01 PROCEDURE — 80053 COMPREHEN METABOLIC PANEL: CPT | Performed by: OBSTETRICS & GYNECOLOGY

## 2021-12-01 PROCEDURE — 84112 EVAL AMNIOTIC FLUID PROTEIN: CPT | Performed by: OBSTETRICS & GYNECOLOGY

## 2021-12-01 PROCEDURE — 86901 BLOOD TYPING SEROLOGIC RH(D): CPT | Performed by: OBSTETRICS & GYNECOLOGY

## 2021-12-01 PROCEDURE — 82803 BLOOD GASES ANY COMBINATION: CPT | Performed by: OBSTETRICS & GYNECOLOGY

## 2021-12-01 RX ORDER — DOCUSATE SODIUM 100 MG/1
100 CAPSULE, LIQUID FILLED ORAL DAILY
Status: DISCONTINUED | OUTPATIENT
Start: 2021-12-01 | End: 2021-12-02 | Stop reason: HOSPADM

## 2021-12-01 RX ORDER — SODIUM CHLORIDE, SODIUM LACTATE, POTASSIUM CHLORIDE, CALCIUM CHLORIDE 600; 310; 30; 20 MG/100ML; MG/100ML; MG/100ML; MG/100ML
150 INJECTION, SOLUTION INTRAVENOUS CONTINUOUS
Status: DISCONTINUED | OUTPATIENT
Start: 2021-12-01 | End: 2021-12-02 | Stop reason: HOSPADM

## 2021-12-01 RX ORDER — HYDROCODONE BITARTRATE AND ACETAMINOPHEN 5; 325 MG/1; MG/1
1 TABLET ORAL EVERY 4 HOURS PRN
Status: DISCONTINUED | OUTPATIENT
Start: 2021-12-01 | End: 2021-12-02 | Stop reason: HOSPADM

## 2021-12-01 RX ORDER — MISOPROSTOL 200 UG/1
600 TABLET ORAL ONCE AS NEEDED
Status: DISCONTINUED | OUTPATIENT
Start: 2021-12-01 | End: 2021-12-02 | Stop reason: HOSPADM

## 2021-12-01 RX ORDER — ACETAMINOPHEN 325 MG/1
650 TABLET ORAL EVERY 6 HOURS PRN
Status: DISCONTINUED | OUTPATIENT
Start: 2021-12-01 | End: 2021-12-02 | Stop reason: HOSPADM

## 2021-12-01 RX ORDER — BISACODYL 10 MG
10 SUPPOSITORY, RECTAL RECTAL DAILY PRN
Status: DISCONTINUED | OUTPATIENT
Start: 2021-12-02 | End: 2021-12-02 | Stop reason: HOSPADM

## 2021-12-01 RX ORDER — OXYTOCIN-SODIUM CHLORIDE 0.9% IV SOLN 30 UNIT/500ML 30-0.9/5 UT/ML-%
SOLUTION INTRAVENOUS
Status: DISPENSED
Start: 2021-12-01 | End: 2021-12-01

## 2021-12-01 RX ORDER — SODIUM CHLORIDE 0.9 % (FLUSH) 0.9 %
1-10 SYRINGE (ML) INJECTION AS NEEDED
Status: DISCONTINUED | OUTPATIENT
Start: 2021-12-01 | End: 2021-12-02 | Stop reason: HOSPADM

## 2021-12-01 RX ORDER — METHYLERGONOVINE MALEATE 0.2 MG/ML
INJECTION INTRAVENOUS
Status: DISCONTINUED
Start: 2021-12-01 | End: 2021-12-01 | Stop reason: WASHOUT

## 2021-12-01 RX ORDER — ONDANSETRON 4 MG/1
4 TABLET, FILM COATED ORAL EVERY 8 HOURS PRN
Status: DISCONTINUED | OUTPATIENT
Start: 2021-12-01 | End: 2021-12-02 | Stop reason: HOSPADM

## 2021-12-01 RX ORDER — CARBOPROST TROMETHAMINE 250 UG/ML
250 INJECTION, SOLUTION INTRAMUSCULAR ONCE AS NEEDED
Status: DISCONTINUED | OUTPATIENT
Start: 2021-12-01 | End: 2021-12-02 | Stop reason: HOSPADM

## 2021-12-01 RX ORDER — MISOPROSTOL 200 UG/1
TABLET ORAL
Status: DISPENSED
Start: 2021-12-01 | End: 2021-12-01

## 2021-12-01 RX ORDER — LIDOCAINE HYDROCHLORIDE 10 MG/ML
INJECTION, SOLUTION EPIDURAL; INFILTRATION; INTRACAUDAL; PERINEURAL
Status: DISCONTINUED
Start: 2021-12-01 | End: 2021-12-01 | Stop reason: WASHOUT

## 2021-12-01 RX ORDER — PROMETHAZINE HYDROCHLORIDE 12.5 MG/1
12.5 TABLET ORAL EVERY 4 HOURS PRN
Status: DISCONTINUED | OUTPATIENT
Start: 2021-12-01 | End: 2021-12-02 | Stop reason: HOSPADM

## 2021-12-01 RX ORDER — MAGNESIUM CARB/ALUMINUM HYDROX 105-160MG
TABLET,CHEWABLE ORAL
Status: DISCONTINUED
Start: 2021-12-01 | End: 2021-12-01 | Stop reason: WASHOUT

## 2021-12-01 RX ORDER — HYDROCODONE BITARTRATE AND ACETAMINOPHEN 10; 325 MG/1; MG/1
1 TABLET ORAL EVERY 4 HOURS PRN
Status: DISCONTINUED | OUTPATIENT
Start: 2021-12-01 | End: 2021-12-02 | Stop reason: HOSPADM

## 2021-12-01 RX ORDER — METHYLERGONOVINE MALEATE 0.2 MG/ML
200 INJECTION INTRAVENOUS ONCE AS NEEDED
Status: DISCONTINUED | OUTPATIENT
Start: 2021-12-01 | End: 2021-12-02 | Stop reason: HOSPADM

## 2021-12-01 RX ORDER — CALCIUM CARBONATE 200(500)MG
2 TABLET,CHEWABLE ORAL 3 TIMES DAILY PRN
Status: DISCONTINUED | OUTPATIENT
Start: 2021-12-01 | End: 2021-12-02 | Stop reason: HOSPADM

## 2021-12-01 RX ADMIN — ENOXAPARIN SODIUM 40 MG: 40 INJECTION SUBCUTANEOUS at 17:11

## 2021-12-01 RX ADMIN — ACETAMINOPHEN 650 MG: 325 TABLET ORAL at 19:53

## 2021-12-01 RX ADMIN — DOCUSATE SODIUM 100 MG: 100 CAPSULE, LIQUID FILLED ORAL at 17:11

## 2021-12-01 RX ADMIN — HYDROCODONE BITARTRATE AND ACETAMINOPHEN 1 TABLET: 5; 325 TABLET ORAL at 14:00

## 2021-12-01 NOTE — SIGNIFICANT NOTE
12/01/21 0910   Nonstress Test   Reason for NST OB Triage  (?SROM?)   Acoustic Stimulator No   Uterine Irritability No   Contractions Irregular   Fetal Assessment   Fetal Movement active   Fetal HR Assessment Method external   Fetal HR (beats/min) 135   Fetal Heart Baseline Rate normal range   Fetal HR Variability moderate (amplitude range 6 to 25 bpm)   Fetal HR Accelerations greater than/equal to 15 bpm; lasting at least 15 seconds   Fetal HR Decelerations variable  (varible with maternal movement)   Fetal HR Tracing Category Category I   Sinusoidal Pattern Present absent   Interpretation A   Nonstress Test Interpretation A Reactive   Interpretation B   Nonstress Test Interpretation B Reactive   /74   Pulse 93   Temp 98.5 °F (36.9 °C)   Resp 18   LMP 03/06/2021   38w4d  Reason for test: (P) OB Triage (?SROM?)  Date of Test: 12/1/2021  Time frame of test: 0721-0845  RN NST Interpretation: (P) Reactive

## 2021-12-01 NOTE — DISCHARGE SUMMARY
OB Discharge Summary        Admit Date:  2021  Date of Delivery: 2021   Discharge Date: 21      Reason for Admission: Active labor    Final Diagnosis:  Hx of PE 2018 (on OCP) and known +LA on prophylactic lovenox/heparin in preg (no anticoag before this preg)  Obesity    intact   Breast/Bottle  To do at FU: Routine PP    Antepartum:  Prenatal care is complicated by:  Hx of PE, positive LA, obesity    Intrapartum/Delivery:  OB Surgeon:  Dr. Shilpa Royal DO  Anesthesia: None  Delivery Type:   Perineum: Intact  Feeding method: Breast and bottle    Infant: male  infant    Weight: 3090 g (6 lb 13 oz)      APGARS: 9  @ 1 minute / 9  @ 5 minutes    Hospital Course/Significant Findings:  Patient arrived at 6 cm dilated, progressed rapidly and had less than 1 hour later uncomplicated  and PP.    Discharge:         Discharge Medications      New Medications      Instructions Start Date   acetaminophen 325 MG tablet  Commonly known as: Tylenol   650 mg, Oral, Every 6 Hours PRN      enoxaparin 40 MG/0.4ML solution syringe  Commonly known as: LOVENOX   40 mg, Subcutaneous, Every 12 Hours Scheduled      HYDROcodone-acetaminophen 5-325 MG per tablet  Commonly known as: NORCO   1-2 tablets, Oral, Every 6 Hours PRN         Continue These Medications      Instructions Start Date   prenatal vitamin 27-0.8 27-0.8 MG tablet tablet   1 tablet, Oral, Daily         Stop These Medications    cetirizine 5 MG tablet  Commonly known as: zyrTEC     heparin (porcine) 75442 UNIT/ML injection              Disposition: Home  Diet: Regular    Pelvic Rest: 6 weeks    Condition at discharge: Good    Follow up with: Shilpa Royal DO or provider of her choice    Follow up in: 5 weeks    Complications: None      Signature:  Electronically signed by Shilpa Royal DO, 21, 8:24 AM EST.        [facility] LABOR AND DELIVERY  [address] [place] [address] [place] [facility] [address]  Dept: [phone]  Loc:  [phone]

## 2021-12-01 NOTE — L&D DELIVERY NOTE
VAGINAL DELIVERY NOTE          Date: 12/1/2021   Time:  11:03 AM   GA: 38w4d    Infant: male  infant   3090 g (6 lb 13 oz)     APGARS: 9  @ 1 minute / 9  @ 5 minutes    Delivery: The patient progressed to complete, complete and pushed for 2 contractions to deliver a viable infant in cephalic presentation weighing the above weight and above Apgars.  There was a loose nuchal/body cord.  Infant delivered through it, it did not need to be reduced.  The mouth and nares were not bulb suctioned on the perineum.  Patient was unable to stop pushing and infant delivered rapidly not allowing time for reduction of the nuchal cord or bulb suctioning on the perineum.  The right anterior and left posterior shoulders delivered without traction, spontaneously.  The remainder of the body delivered.  The infant was vigorous.  Delayed cord clamping for 60 seconds.  The cord was then clamped and cut.  Infant was placed on the maternal chest for recovery.  The placenta delivered with the assistance of fundal massage and maternal pushing efforts.      On full evaluation of the perineum, vagina, cervix and rectum no repair was needed.  Intact perineum.  External anal sphincter was intact.  200 micrograms of Cytotec was given orally and 200 sublingually to assist with uterine tone.    Counts were correct.      EBL: 300 ml    Complications: None        Electronically signed by Shilpa Royal DO, 12/01/21, 12:48 PM SHAJI.       [facility] LABOR AND DELIVERY  [address] [place] [address] [address]VE  [place] [address]  Dept: [phone]  Loc: [phone]

## 2021-12-01 NOTE — H&P
OB HISTORY AND PHYSICAL      SUBJECTIVE:    31 y.o. female  currently at 38w4d [facility] complicated by:  Hx of PE on OCPs in past on prophylactic lovenox/heparin in preg only.  +LA.  Obesity.    CC/HPI:  Presents with Labor.     ROS: Adequate FM    Past OB History:   OB History    Para Term  AB Living   6 4 4   1 4   SAB IAB Ectopic Molar Multiple Live Births   1         4      # Outcome Date GA Lbr Alexandru/2nd Weight Sex Delivery Anes PTL Lv   6 Current            5 Term 16 38w0d  2778 g (6 lb 2 oz) M Vag-Spont   DAVID   4 Term 04/29/15 38w0d  2920 g (6 lb 7 oz) F Vag-Spont   DAVID   3 Term 08/03/10 39w0d  2551 g (5 lb 10 oz) M Vag-Spont   DAVID   2 Term 08 40w0d  2920 g (6 lb 7 oz) F Vag-Spont   DAVID   1 SAB      SAB           Prenatal Labs:  Reviewed, see Epic    PMHx:    Past Medical History:   Diagnosis Date   • Asthma    • History of pulmonary embolism     on OCPs   • Lupus anticoagulant positive    • Migraines    • Urinary incontinence 2021       Home Medications:  cetirizine, heparin (porcine) 10,000 BID- last dose 10pm last night, and prenatal vitamin 27-0.8    Allergies:  Allergies   Allergen Reactions   • Sulfamethoxazole-Trimethoprim Anaphylaxis and Swelling   • Heparin Rash     Patient was told to take benadryl with Heparin. Patient states she had a rash and was told to take benedryl to help with Rash and itchy throat        PSHx:    History reviewed. No pertinent surgical history.    Social History:    reports that she has never smoked. She has never used smokeless tobacco. She reports previous alcohol use. She reports that she does not use drugs.    Family History: Non contributory    Immunizations: See prenatal record for Tdap, Flu, Covid and/or other vaccinations    PHYSICAL EXAM:  Temp:  [97.6 °F (36.4 °C)-99.4 °F (37.4 °C)] 99.4 °F (37.4 °C)  Heart Rate:  [] 86  Resp:  [18-22] 18  BP: (112-154)/(46-79) 112/65  General- NAD, alert and oriented, appropriate  Psych-  normal mood, good memory  CV- Regular rhythm, no murnurs  Resp- CTA to bases, no wheezes  Abdomen- Gravid, non tender  Fundus-  Non tender.  Size: consistent with dates  EFW- 7 lbs   Pelvis-  Adequate   Cvx- Complete / 100% / +3  Presentation- VTX  Ext/DTR: No edema    Fetal HR: Category II  Contractions: Regular    ASSESSMENT:  38w4d  Labor, Preciptous delivery  GBS: Negative  Hx PE and +LA, currently on prophylactic heparin    PLAN:  Admit  Delivery: Pt arrived, 6cm, rapidly progressed,  ,1hr from arrival.  See del note.      Electronically signed by Shilpa Royal DO, 21, 12:43 PM EST.    [facility] LABOR AND DELIVERY  [address] [place] [address] [place]  [place] [address]  Dept: [phone]  Loc: [phone]

## 2021-12-01 NOTE — DISCHARGE INSTRUCTIONS
Vaginal Delivery    Vaginal delivery means that you give birth by pushing your baby out of your birth canal (vagina). A team of health care providers will help you before, during, and after vaginal delivery. Birth experiences are unique for every woman and every pregnancy, and birth experiences vary depending on where you choose to give birth.  What happens when I arrive at the birth center or hospital?  Once you are in labor and have been admitted into the hospital or birth center, your health care provider may:  · Review your pregnancy history and any concerns that you have.  · Insert an IV into one of your veins. This may be used to give you fluids and medicines.  · Check your blood pressure, pulse, temperature, and heart rate (vital signs).  · Check whether your bag of water (amniotic sac) has broken (ruptured).  · Talk with you about your birth plan and discuss pain control options.  Monitoring  Your health care provider may monitor your contractions (uterine monitoring) and your baby's heart rate (fetal monitoring). You may need to be monitored:  · Often, but not continuously (intermittently).  · All the time or for long periods at a time (continuously). Continuous monitoring may be needed if:  ? You are taking certain medicines, such as medicine to relieve pain or make your contractions stronger.  ? You have pregnancy or labor complications.  Monitoring may be done by:  · Placing a special stethoscope or a handheld monitoring device on your abdomen to check your baby's heartbeat and to check for contractions.  · Placing monitors on your abdomen (external monitors) to record your baby's heartbeat and the frequency and length of contractions.  · Placing monitors inside your uterus through your vagina (internal monitors) to record your baby's heartbeat and the frequency, length, and strength of your contractions. Depending on the type of monitor, it may remain in your uterus or on your baby's head until  birth.  · Telemetry. This is a type of continuous monitoring that can be done with external or internal monitors. Instead of having to stay in bed, you are able to move around during telemetry.  Physical exam  Your health care provider may perform frequent physical exams. This may include:  · Checking how and where your baby is positioned in your uterus.  · Checking your cervix to determine:  ? Whether it is thinning out (effacing).  ? Whether it is opening up (dilating).  What happens during labor and delivery?    Normal labor and delivery is divided into the following three stages:  Stage 1  · This is the longest stage of labor.  · This stage can last for hours or days.  · Throughout this stage, you will feel contractions. Contractions generally feel mild, infrequent, and irregular at first. They get stronger, more frequent (about every 2-3 minutes), and more regular as you move through this stage.  · This stage ends when your cervix is completely dilated to 4 inches (10 cm) and completely effaced.  Stage 2  · This stage starts once your cervix is completely effaced and dilated and lasts until the delivery of your baby.  · This stage may last from 20 minutes to 2 hours.  · This is the stage where you will feel an urge to push your baby out of your vagina.  · You may feel stretching and burning pain, especially when the widest part of your baby's head passes through the vaginal opening ().  · Once your baby is delivered, the umbilical cord will be clamped and cut. This usually occurs after waiting a period of 1-2 minutes after delivery.  · Your baby will be placed on your bare chest (skin-to-skin contact) in an upright position and covered with a warm blanket. Watch your baby for feeding cues, like rooting or sucking, and help the baby to your breast for his or her first feeding.  Stage 3  · This stage starts immediately after the birth of your baby and ends after you deliver the placenta.  · This stage may  take anywhere from 5 to 30 minutes.  · After your baby has been delivered, you will feel contractions as your body expels the placenta and your uterus contracts to control bleeding.  What can I expect after labor and delivery?  · After labor is over, you and your baby will be monitored closely until you are ready to go home to ensure that you are both healthy. Your health care team will teach you how to care for yourself and your baby.  · You and your baby will stay in the same room (rooming in) during your hospital stay. This will encourage early bonding and successful breastfeeding.  · You may continue to receive fluids and medicines through an IV.  · Your uterus will be checked and massaged regularly (fundal massage).  · You will have some soreness and pain in your abdomen, vagina, and the area of skin between your vaginal opening and your anus (perineum).  · If an incision was made near your vagina (episiotomy) or if you had some vaginal tearing during delivery, cold compresses may be placed on your episiotomy or your tear. This helps to reduce pain and swelling.  · You may be given a squirt bottle to use instead of wiping when you go to the bathroom. To use the squirt bottle, follow these steps:  ? Before you urinate, fill the squirt bottle with warm water. Do not use hot water.  ? After you urinate, while you are sitting on the toilet, use the squirt bottle to rinse the area around your urethra and vaginal opening. This rinses away any urine and blood.  ? Fill the squirt bottle with clean water every time you use the bathroom.  · It is normal to have vaginal bleeding after delivery. Wear a sanitary pad for vaginal bleeding and discharge.  Summary  · Vaginal delivery means that you will give birth by pushing your baby out of your birth canal (vagina).  · Your health care provider may monitor your contractions (uterine monitoring) and your baby's heart rate (fetal monitoring).  · Your health care provider may  perform a physical exam.  · Normal labor and delivery is divided into three stages.  · After labor is over, you and your baby will be monitored closely until you are ready to go home.  This information is not intended to replace advice given to you by your health care provider. Make sure you discuss any questions you have with your health care provider.  Document Revised: 01/22/2019 Document Reviewed: 01/22/2019  [employer] Patient Education © 2021 [employer]  DR. COLON'S POSTPARTUM DISCHARGE PRECAUTIONS and Answers to FAQs     NO SEX for SIX weeks.     NO TUB BATH or POOL for TWO week(s), shower only.  Sitz baths are fine.     STITCHES (if present):  wash them daily in the shower with soap and water (any type of soap is fine, it does not need to be antibacterial soap).  It is ok to gently put your finger in and around the vaginal area.  Look at your stitches (the ones on the outside) when you get home.  You will then know what is normal and can have a point of reference to compare it to if you start to have concerns.  REDNESS, PUS, increase in PAIN, FEVER or CHILLS are all reasons to be seen our office immediately.  Go to the ER, if it is after hours or a weekend.       VAGINAL BLEEDING:  may continue on and off over the next several weeks after delivery and may increase slightly once you go home.  You should not be bleeding more than 1 large pad soaked every hour or two.  Clots (even the size of a lemon or larger) may be normal as long as the bleeding is not heavy and the clots do not continue.       FEVER or CHILLS or NOT FEELING WELL: call our office.  If the office is closed, you need to be seen in acute care or ER.       CHEST PAIN or SHORTNESS OF BREATH/AIR: you need to GO TO THE NEAREST ER or CALL 911.      SWELLING: can increase over the next 7-10 days and then should slowly improve.  Your legs/ankles should be fairly similar in size.  A red, painful, hot, swollen leg (usually just one side) can be a sign  of a blood clot and should be evaluated immediately.  Call our office.  If it is after hours or a weekend, you must be seen IMMEDIATELY IN THE ER.      ELEVATED BLOOD PRESSURE:  you need to contact us if you are having  persistent elevated BP systolic (top number) more than 155 or diastolic (bottom number) more than 95, or a headache (not relieved with rest, hydration or over the counter pain reliever), an increase in your swelling (usually hands and face), changes in your vision (typically flashing white or black spots) or severe persistent pain in the location of the upper right side of your belly (under your right breast).  Call our office or go to ER if after hours or a weekend.     LACTATION QUESTIONS or CONCERNS?  Call [facility] Lactation Support [phone].     WORK and SCHOOL TIME OFF: depends on your specific delivery type, surrounding circumstances, and your work insurance/school rules.  If you have questions, please call Christy or Mony at [phone] (ext. [phone]).  Or email Christy at [email or web address].  They will assist in required paperwork for you and/or family members.      Any further QUESTIONS or CONCERNS, please call [facility] at [phone].

## 2021-12-02 VITALS
OXYGEN SATURATION: 98 % | SYSTOLIC BLOOD PRESSURE: 120 MMHG | TEMPERATURE: 98.2 F | RESPIRATION RATE: 16 BRPM | BODY MASS INDEX: 38.41 KG/M2 | DIASTOLIC BLOOD PRESSURE: 60 MMHG | HEART RATE: 97 BPM | HEIGHT: 64 IN | WEIGHT: 225 LBS

## 2021-12-02 PROCEDURE — 25010000002 ENOXAPARIN PER 10 MG: Performed by: OBSTETRICS & GYNECOLOGY

## 2021-12-02 PROCEDURE — 0503F POSTPARTUM CARE VISIT: CPT | Performed by: OBSTETRICS & GYNECOLOGY

## 2021-12-02 RX ORDER — ACETAMINOPHEN 325 MG/1
650 TABLET ORAL EVERY 6 HOURS PRN
Qty: 30 TABLET | Refills: 1 | Status: SHIPPED | OUTPATIENT
Start: 2021-12-02 | End: 2021-12-12

## 2021-12-02 RX ORDER — HYDROCODONE BITARTRATE AND ACETAMINOPHEN 5; 325 MG/1; MG/1
1-2 TABLET ORAL EVERY 6 HOURS PRN
Qty: 10 TABLET | Refills: 0 | Status: SHIPPED | OUTPATIENT
Start: 2021-12-02 | End: 2021-12-12

## 2021-12-02 RX ADMIN — HYDROCODONE BITARTRATE AND ACETAMINOPHEN 1 TABLET: 5; 325 TABLET ORAL at 08:18

## 2021-12-02 RX ADMIN — DOCUSATE SODIUM 100 MG: 100 CAPSULE, LIQUID FILLED ORAL at 08:12

## 2021-12-02 RX ADMIN — ENOXAPARIN SODIUM 40 MG: 40 INJECTION SUBCUTANEOUS at 17:18

## 2021-12-02 RX ADMIN — HYDROCODONE BITARTRATE AND ACETAMINOPHEN 1 TABLET: 5; 325 TABLET ORAL at 02:08

## 2021-12-02 RX ADMIN — ENOXAPARIN SODIUM 40 MG: 40 INJECTION SUBCUTANEOUS at 05:13

## 2021-12-02 RX ADMIN — ACETAMINOPHEN 650 MG: 325 TABLET ORAL at 02:08

## 2021-12-02 NOTE — PLAN OF CARE
Problem: Adult Inpatient Plan of Care  Goal: Plan of Care Review  Outcome: Ongoing, Progressing  Flowsheets (Taken 12/2/2021 0238)  Plan of Care Reviewed With: patient  Goal: Patient-Specific Goal (Individualized)  Outcome: Ongoing, Progressing  Goal: Absence of Hospital-Acquired Illness or Injury  Outcome: Ongoing, Progressing  Intervention: Identify and Manage Fall Risk  Recent Flowsheet Documentation  Taken 12/2/2021 0135 by Maryann Rainey RN  Safety Promotion/Fall Prevention: safety round/check completed  Taken 12/1/2021 2330 by Maryann Rainey RN  Safety Promotion/Fall Prevention: safety round/check completed  Taken 12/1/2021 2119 by Maryann Rainey RN  Safety Promotion/Fall Prevention: safety round/check completed  Taken 12/1/2021 1950 by Maryann Rainey RN  Safety Promotion/Fall Prevention: safety round/check completed  Intervention: Prevent Infection  Recent Flowsheet Documentation  Taken 12/1/2021 2012 by Maryann Rainey RN  Infection Prevention:   visitors restricted/screened   single patient room provided   hand hygiene promoted  Goal: Optimal Comfort and Wellbeing  Outcome: Ongoing, Progressing  Intervention: Provide Person-Centered Care  Recent Flowsheet Documentation  Taken 12/1/2021 2012 by Maryann Rainey RN  Trust Relationship/Rapport:   care explained   choices provided   empathic listening provided   questions answered   thoughts/feelings acknowledged  Goal: Readiness for Transition of Care  Outcome: Ongoing, Progressing     Problem: Adjustment to Role Transition (Postpartum Vaginal Delivery)  Goal: Successful Maternal Role Transition  Outcome: Ongoing, Progressing  Intervention: Support Maternal Role Transition  Recent Flowsheet Documentation  Taken 12/1/2021 2012 by Maryann Rainey RN  Supportive Measures:   active listening utilized   decision-making supported   self-care encouraged  Parent/Child Attachment Promotion:   caring behavior modeled   face-to-face positioning promoted   rooming-in  promoted   parent/caregiver presence encouraged   participation in care promoted     Problem: Bleeding (Postpartum Vaginal Delivery)  Goal: Hemostasis  Outcome: Ongoing, Progressing     Problem: Infection (Postpartum Vaginal Delivery)  Goal: Absence of Infection Signs and Symptoms  Outcome: Ongoing, Progressing     Problem: Pain (Postpartum Vaginal Delivery)  Goal: Acceptable Pain Control  Outcome: Ongoing, Progressing  Intervention: Prevent or Manage Pain  Recent Flowsheet Documentation  Taken 12/1/2021 2140 by Maryann Rainey, RN  Pain Management Interventions: (heat pad given) heat applied  Taken 12/1/2021 1953 by Maryann Rainey, RN  Pain Management Interventions: see MAR     Problem: Urinary Retention (Postpartum Vaginal Delivery)  Goal: Effective Urinary Elimination  Outcome: Ongoing, Progressing   Goal Outcome Evaluation:  Plan of Care Reviewed With: patient   VS stable, good pain management, independent with ADLs, positive interaction with infant. Obstetrically stable.

## 2021-12-02 NOTE — LACTATION NOTE
LC in to see this P5 patient this am at 0900 about her feeding preference. DEMETRIA noted that infant has had only formula since birth. Patient states she is only going to formula feed. She requested the phone number for the [facility] and this was provided. DEMETRIA discussed with her that her breasts may become alexander in a few days and how to manage this as she dries up her breasts. She expressed good understanding.

## 2021-12-02 NOTE — PROGRESS NOTES
PostPartum/PostOp PROGRESS NOTE        Subjective:  • Patient has no complaints  • Pain controlled  • Ambulating  • Urinating spontaneously  • Lochia decreasing, no bleeding concerns      Objective:     Vitals: reviewed  General- NAD, alert and oriented, appropriate  Psych- Normal mood, good memory  CV/Resp- Not examined  Abdomen- Fundus firm, non tender and Fundus at U-1  Ext/DTRs- No edema  CBC    CBC 8/26/21 8/26/21 11/8/21 12/1/21    0000 0000     WBC   12.80 (A) 15.38 (A)   RBC   4.12 4.63   Hemoglobin 12.7  11.3 (A) 12.8   Hematocrit  39 34.0 37.3   MCV   82.5 80.6   MCH   27.4 27.6   MCHC   33.2 34.3   RDW   12.7 13.8   Platelets   326 364   (A) Abnormal value            CMP    CMP 4/15/21 5/21/21 12/1/21   Glucose 99 101 (A) 115 (A)   BUN 9 8 8   Creatinine 0.70 0.62 0.80   eGFR Non  Am   84   eGFR African Am   101   Sodium 136 135 131 (A)   Potassium 3.8 3.8 3.5   Chloride 103 104 98   Calcium 9.2 9.0 9.3   Albumin 3.6 3.5 3.50   Total Bilirubin 0.61 0.48 0.3   Alkaline Phosphatase 75 69 154 (A)   AST (SGOT) 15 19 11   ALT (SGPT) 12 14 6   (A) Abnormal value       Comments are available for some flowsheets but are not being displayed.             Assessment:    Post-partum/postop Day:  1    Plan:   • Routine postpartum/postop care  • Discharge home  • DC meds reviewed  • Follow up scheduled  • PP/PO precautions given              Electronically signed by Shilpa Royal DO, 12/02/21, 8:21 AM EST.

## 2021-12-05 NOTE — SIGNIFICANT NOTE
12/05/21 0947   Nonstress Test   Reason for NST OB Triage; Other (Comment)  (contractions)   Acoustic Stimulator No   Uterine Irritability No   Contractions Irregular   Fetal Assessment   Fetal Movement active   Fetal HR Assessment Method external   Fetal HR (beats/min) 130  (130's)   Fetal Heart Baseline Rate normal range   Fetal HR Variability moderate (amplitude range 6 to 25 bpm)   Fetal HR Accelerations episodic; greater than/equal to 15 bpm   Fetal HR Decelerations variable  (broken strip)   Fetal HR Tracing Category Category I   Sinusoidal Pattern Present absent   Interpretation A   Nonstress Test Interpretation A Reactive

## 2021-12-06 ENCOUNTER — TELEPHONE (OUTPATIENT)
Dept: OBSTETRICS AND GYNECOLOGY | Facility: CLINIC | Age: 31
End: 2021-12-06

## 2022-01-11 PROBLEM — Z86.711 HISTORY OF PULMONARY EMBOLISM: Status: RESOLVED | Noted: 2021-11-08 | Resolved: 2022-01-11

## 2022-01-11 PROBLEM — J45.909 ASTHMA: Status: RESOLVED | Noted: 2021-11-22 | Resolved: 2022-01-11

## 2022-01-11 PROBLEM — F90.9 ADHD: Status: RESOLVED | Noted: 2021-11-22 | Resolved: 2022-01-11

## 2022-01-13 NOTE — PROGRESS NOTES
POSTPARTUM Follow Up Visit    CC: Postpartum FU  Chief Complaint   Patient presents with   • Postpartum Care       HPI:    • Antepartum or Postpartum complications: Hx of PE on OCP, positive LA, obesity  • Date of delivery: 2021  • Delivery type:            Perineum: Intact  • Delivering Provider:   Dr. Shilpa Royal      • Feeding: Bottle  • Pain:  No  • Vaginal Bleeding:  No  • Depressed/Anxious:  No  EPDS score: 1   #10: 0  • Plans for BC:  Desires to discuss options  • Weight at last OB OV:  225lb  Last Completed Pap Smear          PAP SMEAR (Every 3 Years) Next due on 2021  SCANNED - PAP SMEAR              Finishing lovenox, was not on lovenox prior to preg and finished FU w PCP re hx ABHI    PHYSICAL EXAM:  /74   Pulse 65   Wt 93.4 kg (206 lb)   BMI 35.36 kg/m²  Not found.  General- NAD, alert and oriented, appropriate  Psych- Normal mood, good memory      Abdomen- Soft, non distended, non tender, no masses    External genitalia- Normal.    Urethra/Bladder/Vagina- Normal, no masses, non-tender, no prolapse     Cvx- Normal, no lesions, no discharge, no CMT  Uterus- Normal size, shape & consistency.  Non tender, mobile, & no prolapse  Adnexa- Normal, no mass, non-tender    Lymphatic- No palpable groin nodes  Ext- No edema, no cyanosis   Skin- No lesions, no rashes, no acanthosis nigricans    ASSESSMENT AND PLAN:  Diagnoses and all orders for this visit:    1. Postpartum follow-up (Primary)    2. Birth control counseling    3. Hx of pulmonary embolus    4. Lupus anticoagulant positive        Counseling:    • All birth control options reviewed in detail.  R/B/A/SE/E of each wrt pts PMHx and prior BC use.  • Core strengthening exercises reviewed and recommended  • Kegel exercises reviewed and recommended  • Ok to return to work/school  • Abstinence until IUD placed  • Importance of COVID vaccine  • Importance of FLU vaccine    Follow Up:  Return in about 2 weeks (around 2022)  for IUD- schedule insertion, Copper, UHCG day of OV as long as still abstinent.    Separate E+M Time Carve Out: I spent 10 minutes on the separately reported service of contraception wrt her hx of PE. This time is not included in the time used to support the E/M service also reported today.      Shilpa Royal,   01/14/2022    [facility]  [facility] [place] [address]  Dept: [phone]  Dept Fax: [phone]  Loc: [phone]  Loc Fax: [phone]

## 2022-01-14 ENCOUNTER — POSTPARTUM VISIT (OUTPATIENT)
Dept: OBSTETRICS AND GYNECOLOGY | Facility: CLINIC | Age: 32
End: 2022-01-14

## 2022-01-14 VITALS
SYSTOLIC BLOOD PRESSURE: 124 MMHG | BODY MASS INDEX: 35.36 KG/M2 | DIASTOLIC BLOOD PRESSURE: 74 MMHG | WEIGHT: 206 LBS | HEART RATE: 65 BPM

## 2022-01-14 DIAGNOSIS — Z86.711 HX OF PULMONARY EMBOLUS: ICD-10-CM

## 2022-01-14 DIAGNOSIS — Z30.09 BIRTH CONTROL COUNSELING: ICD-10-CM

## 2022-01-14 DIAGNOSIS — R76.0 LUPUS ANTICOAGULANT POSITIVE: ICD-10-CM

## 2022-01-14 PROCEDURE — 0503F POSTPARTUM CARE VISIT: CPT | Performed by: OBSTETRICS & GYNECOLOGY

## 2022-01-14 PROCEDURE — 99212 OFFICE O/P EST SF 10 MIN: CPT | Performed by: OBSTETRICS & GYNECOLOGY

## 2022-07-06 PROCEDURE — 87086 URINE CULTURE/COLONY COUNT: CPT | Performed by: PHYSICIAN ASSISTANT

## 2022-07-22 ENCOUNTER — TRANSCRIBE ORDERS (OUTPATIENT)
Dept: ADMINISTRATIVE | Facility: HOSPITAL | Age: 32
End: 2022-07-22

## 2022-07-22 DIAGNOSIS — N91.1 SECONDARY AMENORRHEA: Primary | ICD-10-CM

## 2022-08-12 ENCOUNTER — APPOINTMENT (OUTPATIENT)
Dept: ULTRASOUND IMAGING | Facility: HOSPITAL | Age: 32
End: 2022-08-12

## 2022-08-18 ENCOUNTER — TELEPHONE (OUTPATIENT)
Dept: NEUROLOGY | Facility: CLINIC | Age: 32
End: 2022-08-18

## 2022-10-12 PROCEDURE — 87660 TRICHOMONAS VAGIN DIR PROBE: CPT | Performed by: NURSE PRACTITIONER

## 2022-10-12 PROCEDURE — 87591 N.GONORRHOEAE DNA AMP PROB: CPT | Performed by: NURSE PRACTITIONER

## 2022-10-12 PROCEDURE — 87491 CHLMYD TRACH DNA AMP PROBE: CPT | Performed by: NURSE PRACTITIONER

## 2022-10-12 PROCEDURE — 87480 CANDIDA DNA DIR PROBE: CPT | Performed by: NURSE PRACTITIONER

## 2022-10-12 PROCEDURE — 87086 URINE CULTURE/COLONY COUNT: CPT | Performed by: NURSE PRACTITIONER

## 2022-10-12 PROCEDURE — 87510 GARDNER VAG DNA DIR PROBE: CPT | Performed by: NURSE PRACTITIONER

## 2022-10-13 ENCOUNTER — TELEPHONE (OUTPATIENT)
Dept: URGENT CARE | Facility: CLINIC | Age: 32
End: 2022-10-13

## 2022-10-13 DIAGNOSIS — B96.89 BACTERIAL VAGINOSIS: Primary | ICD-10-CM

## 2022-10-13 DIAGNOSIS — N76.0 BACTERIAL VAGINOSIS: Primary | ICD-10-CM

## 2022-10-13 RX ORDER — METRONIDAZOLE 500 MG/1
500 TABLET ORAL 2 TIMES DAILY
Qty: 14 TABLET | Refills: 0 | Status: SHIPPED | OUTPATIENT
Start: 2022-10-13 | End: 2022-11-01

## 2022-11-01 ENCOUNTER — OFFICE VISIT (OUTPATIENT)
Dept: FAMILY MEDICINE CLINIC | Facility: CLINIC | Age: 32
End: 2022-11-01

## 2022-11-01 ENCOUNTER — LAB (OUTPATIENT)
Dept: LAB | Facility: HOSPITAL | Age: 32
End: 2022-11-01

## 2022-11-01 VITALS
WEIGHT: 224.8 LBS | DIASTOLIC BLOOD PRESSURE: 65 MMHG | TEMPERATURE: 98.1 F | OXYGEN SATURATION: 100 % | HEART RATE: 69 BPM | HEIGHT: 64 IN | SYSTOLIC BLOOD PRESSURE: 124 MMHG | RESPIRATION RATE: 16 BRPM | BODY MASS INDEX: 38.38 KG/M2

## 2022-11-01 DIAGNOSIS — R30.0 DYSURIA: Primary | ICD-10-CM

## 2022-11-01 DIAGNOSIS — E66.09 CLASS 2 OBESITY DUE TO EXCESS CALORIES WITHOUT SERIOUS COMORBIDITY WITH BODY MASS INDEX (BMI) OF 38.0 TO 38.9 IN ADULT: ICD-10-CM

## 2022-11-01 DIAGNOSIS — R53.83 OTHER FATIGUE: ICD-10-CM

## 2022-11-01 DIAGNOSIS — N30.00 ACUTE CYSTITIS WITHOUT HEMATURIA: ICD-10-CM

## 2022-11-01 DIAGNOSIS — E66.09 CLASS 2 OBESITY DUE TO EXCESS CALORIES WITHOUT SERIOUS COMORBIDITY WITH BODY MASS INDEX (BMI) OF 38.0 TO 38.9 IN ADULT: Chronic | ICD-10-CM

## 2022-11-01 DIAGNOSIS — R30.0 DYSURIA: ICD-10-CM

## 2022-11-01 PROBLEM — D68.62 LUPUS ANTICOAGULANT DISORDER: Status: ACTIVE | Noted: 2021-05-09

## 2022-11-01 LAB
BASOPHILS # BLD AUTO: 0.07 10*3/MM3 (ref 0–0.2)
BASOPHILS NFR BLD AUTO: 0.7 % (ref 0–1.5)
BILIRUB BLD-MCNC: NEGATIVE MG/DL
CLARITY, POC: CLEAR
COLOR UR: YELLOW
DEPRECATED RDW RBC AUTO: 41.5 FL (ref 37–54)
EOSINOPHIL # BLD AUTO: 0.27 10*3/MM3 (ref 0–0.4)
EOSINOPHIL NFR BLD AUTO: 2.9 % (ref 0.3–6.2)
ERYTHROCYTE [DISTWIDTH] IN BLOOD BY AUTOMATED COUNT: 13.2 % (ref 12.3–15.4)
EXPIRATION DATE: ABNORMAL
GLUCOSE UR STRIP-MCNC: NEGATIVE MG/DL
HCT VFR BLD AUTO: 41.5 % (ref 34–46.6)
HGB BLD-MCNC: 13.6 G/DL (ref 12–15.9)
IMM GRANULOCYTES # BLD AUTO: 0.03 10*3/MM3 (ref 0–0.05)
IMM GRANULOCYTES NFR BLD AUTO: 0.3 % (ref 0–0.5)
KETONES UR QL: NEGATIVE
LEUKOCYTE EST, POC: ABNORMAL
LYMPHOCYTES # BLD AUTO: 3.44 10*3/MM3 (ref 0.7–3.1)
LYMPHOCYTES NFR BLD AUTO: 36.5 % (ref 19.6–45.3)
Lab: ABNORMAL
MCH RBC QN AUTO: 28.2 PG (ref 26.6–33)
MCHC RBC AUTO-ENTMCNC: 32.8 G/DL (ref 31.5–35.7)
MCV RBC AUTO: 86.1 FL (ref 79–97)
MONOCYTES # BLD AUTO: 0.47 10*3/MM3 (ref 0.1–0.9)
MONOCYTES NFR BLD AUTO: 5 % (ref 5–12)
NEUTROPHILS NFR BLD AUTO: 5.14 10*3/MM3 (ref 1.7–7)
NEUTROPHILS NFR BLD AUTO: 54.6 % (ref 42.7–76)
NITRITE UR-MCNC: NEGATIVE MG/ML
NRBC BLD AUTO-RTO: 0 /100 WBC (ref 0–0.2)
PH UR: 5.5 [PH] (ref 5–8)
PLATELET # BLD AUTO: 413 10*3/MM3 (ref 140–450)
PMV BLD AUTO: 9.7 FL (ref 6–12)
PROT UR STRIP-MCNC: NEGATIVE MG/DL
RBC # BLD AUTO: 4.82 10*6/MM3 (ref 3.77–5.28)
RBC # UR STRIP: NEGATIVE /UL
SP GR UR: 1.03
UROBILINOGEN UR QL: NORMAL
WBC NRBC COR # BLD: 9.42 10*3/MM3 (ref 3.4–10.8)

## 2022-11-01 PROCEDURE — 83540 ASSAY OF IRON: CPT

## 2022-11-01 PROCEDURE — 85025 COMPLETE CBC W/AUTO DIFF WBC: CPT

## 2022-11-01 PROCEDURE — 84466 ASSAY OF TRANSFERRIN: CPT

## 2022-11-01 PROCEDURE — 87086 URINE CULTURE/COLONY COUNT: CPT

## 2022-11-01 PROCEDURE — 80053 COMPREHEN METABOLIC PANEL: CPT

## 2022-11-01 PROCEDURE — 82607 VITAMIN B-12: CPT

## 2022-11-01 PROCEDURE — 36415 COLL VENOUS BLD VENIPUNCTURE: CPT

## 2022-11-01 PROCEDURE — 80061 LIPID PANEL: CPT

## 2022-11-01 PROCEDURE — 82728 ASSAY OF FERRITIN: CPT

## 2022-11-01 PROCEDURE — 84443 ASSAY THYROID STIM HORMONE: CPT

## 2022-11-01 PROCEDURE — 99214 OFFICE O/P EST MOD 30 MIN: CPT | Performed by: FAMILY MEDICINE

## 2022-11-01 PROCEDURE — 82746 ASSAY OF FOLIC ACID SERUM: CPT

## 2022-11-01 NOTE — PROGRESS NOTES
"Chief Complaint  Weight Loss, Back Pain (Possible uti ), and Urinary Frequency    Subjective        Bing Poe presents to [facility]  History of Present Illness    The patient is a 32-year-old female who presents to [facility] care. She has some dysuria and UTI symptoms today. She has obesity with a BMI of 38. She would like to discuss weight loss options and medications if indicated to her. She has not been on medication before. HERIBERTO review, insurance passport, and phentermine may be option as she is not hypertensive. She has a history of heart disease at a young age versus other.    The patient reports she is allergic to BACTRIM. She states she does not eat a lot; however, she does not eat breakfast or lunch. She adds she does snack on her lunch break. She notes she eats dinner time. She denies counting calories. She reports she has not eaten today.     She notes she was on Herbalife in the past; however, she discontinued taking it because she felt \" weird \" and her body was not functioning properly. She complains of not having energy. She notes she is currently taking \"sea jeff\". She reports she used to take a pill for her energy; however, she felt like she kept having to increase the dosage and then it was not working anymore.    She states she had blood clots in her lung in 2018  when she was on birth control. She denies taking aspirin. She denies shortness of breath.    Objective   Vital Signs:  /65   Pulse 69   Temp 98.1 °F (36.7 °C)   Resp 16   Ht 162.6 cm (64\")   Wt 102 kg (224 lb 12.8 oz)   SpO2 100%   BMI 38.59 kg/m²   Estimated body mass index is 38.59 kg/m² as calculated from the following:    Height as of this encounter: 162.6 cm (64\").    Weight as of this encounter: 102 kg (224 lb 12.8 oz).    Class 2 Severe Obesity (BMI >=35 and <=39.9). Obesity-related health conditions include the following: none. Obesity is unchanged. BMI is is " above average; BMI management plan is completed. We discussed portion control, increasing exercise and pharmacologic options including discussed treatment options.      Physical Exam  Vitals reviewed.   Constitutional:       Appearance: Normal appearance. She is well-developed.   HENT:      Head: Normocephalic and atraumatic.      Right Ear: External ear normal.      Left Ear: External ear normal.      Nose: Nose normal.   Eyes:      Conjunctiva/sclera: Conjunctivae normal.      Pupils: Pupils are equal, round, and reactive to light.   Cardiovascular:      Rate and Rhythm: Normal rate.   Pulmonary:      Effort: Pulmonary effort is normal.      Breath sounds: Normal breath sounds.   Abdominal:      General: There is no distension.   Skin:     General: Skin is warm and dry.   Neurological:      General: No focal deficit present.      Mental Status: She is alert and oriented to person, place, and time.   Psychiatric:         Mood and Affect: Mood and affect normal.         Behavior: Behavior normal.         Thought Content: Thought content normal.         Judgment: Judgment normal.        Result Review :  The following data was reviewed by: Ace Merino DO on 11/01/2022:  Common labs    Common Labs 11/8/21 12/1/21 12/1/21     1119 1119   Glucose   115 (A)   BUN   8   Creatinine   0.80   eGFR Non  Am   84   eGFR African Am   101   Sodium   131 (A)   Potassium   3.5   Chloride   98   Calcium   9.3   Albumin   3.50   Total Bilirubin   0.3   Alkaline Phosphatase   154 (A)   AST (SGOT)   11   ALT (SGPT)   6   WBC 12.80 (A) 15.38 (A)    Hemoglobin 11.3 (A) 12.8    Hematocrit 34.0 37.3    Platelets 326 364    (A) Abnormal value                      Assessment and Plan   Diagnoses and all orders for this visit:    1. Dysuria (Primary)  -     POCT urinalysis dipstick, automated  -     Urine Culture - Urine, Urine, Clean Catch; Future    2. Class 2 obesity due to excess calories without serious comorbidity with body mass  index (BMI) of 38.0 to 38.9 in adult  -     CBC w AUTO Differential; Future  -     Comprehensive metabolic panel; Future  -     TSH; Future  -     Lipid panel; Future    3. Acute cystitis without hematuria  -     Urine Culture - Urine, Urine, Clean Catch; Future    4. Other fatigue  -     Vitamin B12; Future  -     Folate; Future  -     Iron Profile; Future  -     Ferritin; Future    1. Obesity  - She wants to work on weight loss. Counseled on using calorie counter. Follow-up after labs in the next 2 to 4 weeks to see if working to lose weight and treat labs abnormalities if appropriate and if not or needs medication can discussed further medications to treat obesity and weight loss.    2. UTI  - There is some leukocytes on urine. Follow-up culture and treat if appropriate. For now, we will hold off on managing. Patient otherwise is doing well.    3. Chronic fatigue  - Will order labs.         Follow Up   Return in about 4 weeks (around 11/29/2022), or if symptoms worsen or fail to improve, for Weight check, Recheck.  Patient was given instructions and counseling regarding her condition or for health maintenance advice. Please see specific information pulled into the AVS if appropriate.     Transcribed from ambient dictation for Ace Merino DO by Filemon Kunz.  11/01/22   16:42 EDT    Patient or patient representative verbalized consent to the visit recording.  I have personally performed the services described in this document as transcribed by the above individual, and it is both accurate and complete.

## 2022-11-02 ENCOUNTER — PATIENT ROUNDING (BHMG ONLY) (OUTPATIENT)
Dept: FAMILY MEDICINE CLINIC | Facility: CLINIC | Age: 32
End: 2022-11-02

## 2022-11-02 LAB
ALBUMIN SERPL-MCNC: 3.8 G/DL (ref 3.5–5.2)
ALBUMIN/GLOB SERPL: 1.2 G/DL
ALP SERPL-CCNC: 83 U/L (ref 39–117)
ALT SERPL W P-5'-P-CCNC: 10 U/L (ref 1–33)
ANION GAP SERPL CALCULATED.3IONS-SCNC: 8 MMOL/L (ref 5–15)
AST SERPL-CCNC: 17 U/L (ref 1–32)
BACTERIA SPEC AEROBE CULT: NO GROWTH
BILIRUB SERPL-MCNC: 0.3 MG/DL (ref 0–1.2)
BUN SERPL-MCNC: 9 MG/DL (ref 6–20)
BUN/CREAT SERPL: 12.7 (ref 7–25)
CALCIUM SPEC-SCNC: 8.7 MG/DL (ref 8.6–10.5)
CHLORIDE SERPL-SCNC: 103 MMOL/L (ref 98–107)
CHOLEST SERPL-MCNC: 151 MG/DL (ref 0–200)
CO2 SERPL-SCNC: 27 MMOL/L (ref 22–29)
CREAT SERPL-MCNC: 0.71 MG/DL (ref 0.57–1)
EGFRCR SERPLBLD CKD-EPI 2021: 116 ML/MIN/1.73
FERRITIN SERPL-MCNC: 25.9 NG/ML (ref 13–150)
FOLATE SERPL-MCNC: 8.15 NG/ML (ref 4.78–24.2)
GLOBULIN UR ELPH-MCNC: 3.3 GM/DL
GLUCOSE SERPL-MCNC: 79 MG/DL (ref 65–99)
HDLC SERPL-MCNC: 39 MG/DL (ref 40–60)
IRON 24H UR-MRATE: 35 MCG/DL (ref 37–145)
IRON SATN MFR SERPL: 9 % (ref 20–50)
LDLC SERPL CALC-MCNC: 84 MG/DL (ref 0–100)
LDLC/HDLC SERPL: 2.06 {RATIO}
POTASSIUM SERPL-SCNC: 3.8 MMOL/L (ref 3.5–5.2)
PROT SERPL-MCNC: 7.1 G/DL (ref 6–8.5)
SODIUM SERPL-SCNC: 138 MMOL/L (ref 136–145)
TIBC SERPL-MCNC: 384 MCG/DL (ref 298–536)
TRANSFERRIN SERPL-MCNC: 258 MG/DL (ref 200–360)
TRIGL SERPL-MCNC: 159 MG/DL (ref 0–150)
TSH SERPL DL<=0.05 MIU/L-ACNC: 1.11 UIU/ML (ref 0.27–4.2)
VIT B12 BLD-MCNC: 530 PG/ML (ref 211–946)
VLDLC SERPL-MCNC: 28 MG/DL (ref 5–40)

## 2022-11-02 NOTE — PROGRESS NOTES
November 2, 2022    Hello, may I speak with Bing Poe?    My name is mahad    I am  with [facility]  [address] [place] [id number] [phone].    Before we get started may I verify your date of birth? 1990    I am calling to officially welcome you to our practice and ask about your recent visit. Is this a good time to talk? yes    Tell me about your visit with us. What things went well?  everything went good        We're always looking for ways to make our patients' experiences even better. Do you have recommendations on ways we may improve?  no    Overall were you satisfied with your first visit to our practice? yes       I appreciate you taking the time to speak with me today. Is there anything else I can do for you? no      Thank you, and have a great day.

## 2022-11-06 ENCOUNTER — HOSPITAL ENCOUNTER (EMERGENCY)
Facility: HOSPITAL | Age: 32
Discharge: HOME | End: 2022-11-06
Attending: EMERGENCY MEDICINE | Admitting: EMERGENCY MEDICINE

## 2022-11-06 ENCOUNTER — APPOINTMENT (OUTPATIENT)
Dept: GENERAL RADIOLOGY | Facility: HOSPITAL | Age: 32
End: 2022-11-06

## 2022-11-06 VITALS
HEART RATE: 81 BPM | BODY MASS INDEX: 36.47 KG/M2 | SYSTOLIC BLOOD PRESSURE: 121 MMHG | TEMPERATURE: 98.4 F | HEIGHT: 64 IN | DIASTOLIC BLOOD PRESSURE: 75 MMHG | WEIGHT: 213.63 LBS | RESPIRATION RATE: 14 BRPM | OXYGEN SATURATION: 98 %

## 2022-11-06 DIAGNOSIS — R07.89 CHEST WALL PAIN: Primary | ICD-10-CM

## 2022-11-06 LAB
ALBUMIN SERPL-MCNC: 4.4 G/DL (ref 3.5–5.2)
ALBUMIN/GLOB SERPL: 1.3 G/DL
ALP SERPL-CCNC: 101 U/L (ref 39–117)
ALT SERPL W P-5'-P-CCNC: 15 U/L (ref 1–33)
ANION GAP SERPL CALCULATED.3IONS-SCNC: 9.5 MMOL/L (ref 5–15)
AST SERPL-CCNC: 16 U/L (ref 1–32)
BASOPHILS # BLD AUTO: 0.1 10*3/MM3 (ref 0–0.2)
BASOPHILS NFR BLD AUTO: 0.8 % (ref 0–1.5)
BILIRUB SERPL-MCNC: 0.4 MG/DL (ref 0–1.2)
BILIRUB UR QL STRIP: NEGATIVE
BUN SERPL-MCNC: 12 MG/DL (ref 6–20)
BUN/CREAT SERPL: 15.4 (ref 7–25)
CALCIUM SPEC-SCNC: 8.8 MG/DL (ref 8.6–10.5)
CHLORIDE SERPL-SCNC: 102 MMOL/L (ref 98–107)
CLARITY UR: CLEAR
CO2 SERPL-SCNC: 28.5 MMOL/L (ref 22–29)
COLOR UR: YELLOW
CREAT SERPL-MCNC: 0.78 MG/DL (ref 0.57–1)
D DIMER PPP FEU-MCNC: <0.27 MCGFEU/ML (ref 0–0.57)
DEPRECATED RDW RBC AUTO: 40.8 FL (ref 37–54)
EGFRCR SERPLBLD CKD-EPI 2021: 103.6 ML/MIN/1.73
EOSINOPHIL # BLD AUTO: 0.19 10*3/MM3 (ref 0–0.4)
EOSINOPHIL NFR BLD AUTO: 1.6 % (ref 0.3–6.2)
ERYTHROCYTE [DISTWIDTH] IN BLOOD BY AUTOMATED COUNT: 13.1 % (ref 12.3–15.4)
GLOBULIN UR ELPH-MCNC: 3.5 GM/DL
GLUCOSE SERPL-MCNC: 109 MG/DL (ref 65–99)
GLUCOSE UR STRIP-MCNC: NEGATIVE MG/DL
HCT VFR BLD AUTO: 43.3 % (ref 34–46.6)
HGB BLD-MCNC: 14.4 G/DL (ref 12–15.9)
HGB UR QL STRIP.AUTO: NEGATIVE
HOLD SPECIMEN: NORMAL
IMM GRANULOCYTES # BLD AUTO: 0.04 10*3/MM3 (ref 0–0.05)
IMM GRANULOCYTES NFR BLD AUTO: 0.3 % (ref 0–0.5)
KETONES UR QL STRIP: NEGATIVE
LEUKOCYTE ESTERASE UR QL STRIP.AUTO: NEGATIVE
LIPASE SERPL-CCNC: 31 U/L (ref 13–60)
LYMPHOCYTES # BLD AUTO: 3.03 10*3/MM3 (ref 0.7–3.1)
LYMPHOCYTES NFR BLD AUTO: 24.7 % (ref 19.6–45.3)
MAGNESIUM SERPL-MCNC: 1.8 MG/DL (ref 1.6–2.6)
MCH RBC QN AUTO: 28.2 PG (ref 26.6–33)
MCHC RBC AUTO-ENTMCNC: 33.3 G/DL (ref 31.5–35.7)
MCV RBC AUTO: 84.9 FL (ref 79–97)
MONOCYTES # BLD AUTO: 0.59 10*3/MM3 (ref 0.1–0.9)
MONOCYTES NFR BLD AUTO: 4.8 % (ref 5–12)
NEUTROPHILS NFR BLD AUTO: 67.8 % (ref 42.7–76)
NEUTROPHILS NFR BLD AUTO: 8.3 10*3/MM3 (ref 1.7–7)
NITRITE UR QL STRIP: NEGATIVE
NRBC BLD AUTO-RTO: 0 /100 WBC (ref 0–0.2)
NT-PROBNP SERPL-MCNC: <36 PG/ML (ref 0–450)
PH UR STRIP.AUTO: 6.5 [PH] (ref 5–8)
PLATELET # BLD AUTO: 420 10*3/MM3 (ref 140–450)
PMV BLD AUTO: 9.2 FL (ref 6–12)
POTASSIUM SERPL-SCNC: 3.6 MMOL/L (ref 3.5–5.2)
PROT SERPL-MCNC: 7.9 G/DL (ref 6–8.5)
PROT UR QL STRIP: NEGATIVE
QT INTERVAL: 407 MS
RBC # BLD AUTO: 5.1 10*6/MM3 (ref 3.77–5.28)
SODIUM SERPL-SCNC: 140 MMOL/L (ref 136–145)
SP GR UR STRIP: 1.03 (ref 1–1.03)
TROPONIN I SERPL-MCNC: 0 NG/ML (ref 0–0.08)
TROPONIN I SERPL-MCNC: 0 NG/ML (ref 0–0.08)
UROBILINOGEN UR QL STRIP: NORMAL
WBC NRBC COR # BLD: 12.25 10*3/MM3 (ref 3.4–10.8)
WHOLE BLOOD HOLD COAG: NORMAL
WHOLE BLOOD HOLD SPECIMEN: NORMAL

## 2022-11-06 PROCEDURE — 83735 ASSAY OF MAGNESIUM: CPT

## 2022-11-06 PROCEDURE — 93010 ELECTROCARDIOGRAM REPORT: CPT | Performed by: INTERNAL MEDICINE

## 2022-11-06 PROCEDURE — 99284 EMERGENCY DEPT VISIT MOD MDM: CPT

## 2022-11-06 PROCEDURE — 80053 COMPREHEN METABOLIC PANEL: CPT

## 2022-11-06 PROCEDURE — 36415 COLL VENOUS BLD VENIPUNCTURE: CPT

## 2022-11-06 PROCEDURE — 84484 ASSAY OF TROPONIN QUANT: CPT

## 2022-11-06 PROCEDURE — 71045 X-RAY EXAM CHEST 1 VIEW: CPT

## 2022-11-06 PROCEDURE — 83880 ASSAY OF NATRIURETIC PEPTIDE: CPT

## 2022-11-06 PROCEDURE — 96374 THER/PROPH/DIAG INJ IV PUSH: CPT

## 2022-11-06 PROCEDURE — 93005 ELECTROCARDIOGRAM TRACING: CPT | Performed by: EMERGENCY MEDICINE

## 2022-11-06 PROCEDURE — 81003 URINALYSIS AUTO W/O SCOPE: CPT | Performed by: EMERGENCY MEDICINE

## 2022-11-06 PROCEDURE — 85379 FIBRIN DEGRADATION QUANT: CPT | Performed by: EMERGENCY MEDICINE

## 2022-11-06 PROCEDURE — 85025 COMPLETE CBC W/AUTO DIFF WBC: CPT

## 2022-11-06 PROCEDURE — 83690 ASSAY OF LIPASE: CPT

## 2022-11-06 PROCEDURE — 93005 ELECTROCARDIOGRAM TRACING: CPT

## 2022-11-06 PROCEDURE — 25010000002 KETOROLAC TROMETHAMINE PER 15 MG: Performed by: EMERGENCY MEDICINE

## 2022-11-06 RX ORDER — DICLOFENAC SODIUM 75 MG/1
75 TABLET, DELAYED RELEASE ORAL 2 TIMES DAILY PRN
Qty: 20 TABLET | Refills: 0 | Status: SHIPPED | OUTPATIENT
Start: 2022-11-06 | End: 2022-11-22

## 2022-11-06 RX ORDER — KETOROLAC TROMETHAMINE 30 MG/ML
30 INJECTION, SOLUTION INTRAMUSCULAR; INTRAVENOUS ONCE
Status: COMPLETED | OUTPATIENT
Start: 2022-11-06 | End: 2022-11-06

## 2022-11-06 RX ORDER — SODIUM CHLORIDE 0.9 % (FLUSH) 0.9 %
10 SYRINGE (ML) INJECTION AS NEEDED
Status: DISCONTINUED | OUTPATIENT
Start: 2022-11-06 | End: 2022-11-06 | Stop reason: HOSPADM

## 2022-11-06 RX ORDER — ASPIRIN 81 MG/1
324 TABLET, CHEWABLE ORAL ONCE
Status: DISCONTINUED | OUTPATIENT
Start: 2022-11-06 | End: 2022-11-06 | Stop reason: HOSPADM

## 2022-11-06 RX ADMIN — KETOROLAC TROMETHAMINE 30 MG: 30 INJECTION, SOLUTION INTRAMUSCULAR; INTRAVENOUS at 18:48

## 2022-11-06 NOTE — ED PROVIDER NOTES
Time: 6:16 PM EST  Arrived by: private car  Chief Complaint: Chest pain   History provided by: Patient  History is limited by: N/A     History of Present Illness:  Patient is a 32 y.o.  female that presents to the emergency department with L sided chest pain onset today. Pt states that she began to have some L sided chest wall tenderness that she describes as constant, sharp, stabbing, and tight and worsens with inspiration and expiration. Pt reports some L leg numbness and tingling that have been ongoing since Wednesday with no trauma to her legs. Pt also reports urinary frequency and urgency and denies any dysuria.    HPI    Patient Care Team  Primary Care Provider: Ace Merino DO    Past Medical History:     Allergies   Allergen Reactions   • Sulfamethoxazole-Trimethoprim Anaphylaxis and Swelling   • Heparin Rash     Patient was told to take benadryl with Heparin. Patient states she had a rash and was told to take benedryl to help with Rash and itchy throat      Past Medical History:   Diagnosis Date   • ADHD 11/22/2021   • Asthma    • History of pulmonary embolism     on OCPs   • Lupus anticoagulant positive    • Migraines    • Urinary incontinence 03/18/2021     History reviewed. No pertinent surgical history.  Family History   Problem Relation Age of Onset   • Diabetes Mother    • Diabetes Maternal Aunt    • Heart disease Other        Home Medications:  Prior to Admission medications    Not on File        Social History:   Social History     Tobacco Use   • Smoking status: Never   • Smokeless tobacco: Never   Vaping Use   • Vaping Use: Never used   Substance Use Topics   • Alcohol use: Yes     Comment: occasional   • Drug use: Never       Review of Systems:  Review of Systems   Constitutional: Negative for chills and fever.   HENT: Negative for congestion, ear pain and sore throat.    Eyes: Negative for pain.   Respiratory: Negative for cough, chest tightness and shortness of breath.    Cardiovascular:  "Positive for chest pain (L sided).   Gastrointestinal: Negative for abdominal pain, diarrhea, nausea and vomiting.   Genitourinary: Negative for flank pain and hematuria.   Musculoskeletal: Positive for myalgias. Negative for joint swelling.   Skin: Negative for pallor.   Neurological: Negative for seizures and headaches.   All other systems reviewed and are negative.       Physical Exam:  /75 (BP Location: Left arm, Patient Position: Sitting)   Pulse 81   Temp 98.4 °F (36.9 °C) (Oral)   Resp 14   Ht 162.6 cm (64\")   Wt 96.9 kg (213 lb 10 oz)   LMP  (LMP Unknown)   SpO2 98%   Breastfeeding No   BMI 36.67 kg/m²     Physical Exam  Vitals and nursing note reviewed.   Constitutional:       General: She is not in acute distress.     Appearance: Normal appearance. She is not toxic-appearing.   HENT:      Head: Normocephalic and atraumatic.      Mouth/Throat:      Mouth: Mucous membranes are moist.   Eyes:      General: No scleral icterus.  Cardiovascular:      Rate and Rhythm: Normal rate and regular rhythm.      Pulses: Normal pulses.           Carotid pulses are 2+ on the right side and 2+ on the left side.       Radial pulses are 2+ on the right side and 2+ on the left side.        Femoral pulses are 2+ on the right side and 2+ on the left side.       Popliteal pulses are 2+ on the right side and 2+ on the left side.        Dorsalis pedis pulses are 2+ on the right side and 2+ on the left side.        Posterior tibial pulses are 2+ on the right side and 2+ on the left side.      Heart sounds: Normal heart sounds.   Pulmonary:      Effort: Pulmonary effort is normal. No respiratory distress.      Breath sounds: Normal breath sounds.   Chest:      Chest wall: Tenderness present. No swelling.      Comments: L sided tenderness to palpation chest wall that is reproducible.  Abdominal:      General: Abdomen is flat.      Palpations: Abdomen is soft.      Tenderness: There is no abdominal tenderness. "   Musculoskeletal:         General: Normal range of motion.      Cervical back: Normal range of motion and neck supple.   Skin:     General: Skin is warm and dry.   Neurological:      Mental Status: She is alert and oriented to person, place, and time. Mental status is at baseline.                Medications in the Emergency Department:  Medications   sodium chloride 0.9 % flush 10 mL (has no administration in time range)   aspirin chewable tablet 324 mg (324 mg Oral Not Given 11/6/22 1700)   ketorolac (TORADOL) injection 30 mg (30 mg Intravenous Given 11/6/22 1848)        Labs  Lab Results (last 24 hours)     Procedure Component Value Units Date/Time    POC Troponin I with Hold Tube [[id number]] Collected: 11/06/22 1518    Specimen: Blood Updated: 11/06/22 1828    Narrative:      The following orders were created for panel order POC Troponin I with Hold Tube.  Procedure                               Abnormality         Status                     ---------                               -----------         ------                     POC Troponin I[[id number]]                                                              HOLD Troponin-I Tube[[id number]]                             Final result                 Please view results for these tests on the individual orders.    CBC & Differential [[id number]]  (Abnormal) Collected: 11/06/22 1518    Specimen: Blood Updated: 11/06/22 1531    Narrative:      The following orders were created for panel order CBC & Differential.  Procedure                               Abnormality         Status                     ---------                               -----------         ------                     CBC Auto Differential[[id number]]        Abnormal            Final result                 Please view results for these tests on the individual orders.    Comprehensive Metabolic Panel [[id number]]  (Abnormal) Collected: 11/06/22 1518    Specimen: Blood Updated: 11/06/22 1602     Glucose  109 mg/dL      BUN 12 mg/dL      Creatinine 0.78 mg/dL      Sodium 140 mmol/L      Potassium 3.6 mmol/L      Chloride 102 mmol/L      CO2 28.5 mmol/L      Calcium 8.8 mg/dL      Total Protein 7.9 g/dL      Albumin 4.40 g/dL      ALT (SGPT) 15 U/L      AST (SGOT) 16 U/L      Alkaline Phosphatase 101 U/L      Total Bilirubin 0.4 mg/dL      Globulin 3.5 gm/dL      A/G Ratio 1.3 g/dL      BUN/Creatinine Ratio 15.4     Anion Gap 9.5 mmol/L      eGFR 103.6 mL/min/1.73      Comment: National Kidney Foundation and American Society of Nephrology (ASN) Task Force recommended calculation based on the Chronic Kidney Disease Epidemiology Collaboration (CKD-EPI) equation refit without adjustment for race.       Narrative:      GFR Normal >60  Chronic Kidney Disease <60  Kidney Failure <15      Lipase [[id number]]  (Normal) Collected: 11/06/22 1518    Specimen: Blood Updated: 11/06/22 1602     Lipase 31 U/L     BNP [[id number]]  (Normal) Collected: 11/06/22 1518    Specimen: Blood Updated: 11/06/22 1558     proBNP <36.0 pg/mL     Narrative:      Among patients with dyspnea, NT-proBNP is highly sensitive for the detection of acute congestive heart failure. In addition NT-proBNP of <300 pg/ml effectively rules out acute congestive heart failure with 99% negative predictive value.      Magnesium [[id number]]  (Normal) Collected: 11/06/22 1518    Specimen: Blood Updated: 11/06/22 1602     Magnesium 1.8 mg/dL     CBC Auto Differential [[id number]]  (Abnormal) Collected: 11/06/22 1518    Specimen: Blood Updated: 11/06/22 1531     WBC 12.25 10*3/mm3      RBC 5.10 10*6/mm3      Hemoglobin 14.4 g/dL      Hematocrit 43.3 %      MCV 84.9 fL      MCH 28.2 pg      MCHC 33.3 g/dL      RDW 13.1 %      RDW-SD 40.8 fl      MPV 9.2 fL      Platelets 420 10*3/mm3      Neutrophil % 67.8 %      Lymphocyte % 24.7 %      Monocyte % 4.8 %      Eosinophil % 1.6 %      Basophil % 0.8 %      Immature Grans % 0.3 %      Neutrophils, Absolute 8.30 10*3/mm3       Lymphocytes, Absolute 3.03 10*3/mm3      Monocytes, Absolute 0.59 10*3/mm3      Eosinophils, Absolute 0.19 10*3/mm3      Basophils, Absolute 0.10 10*3/mm3      Immature Grans, Absolute 0.04 10*3/mm3      nRBC 0.0 /100 WBC     D-dimer, Quantitative [[id number]]  (Normal) Collected: 11/06/22 1518    Specimen: Blood Updated: 11/06/22 1905     D-Dimer, Quantitative <0.27 MCGFEU/mL     Narrative:      The Stago D-Dimer test used in conjunction with a clinical pretest probability (PTP) assessment model, has been approved by the FDA to rule out the presence of venous thromboembolism (VTE) in outpatients suspected of deep venous thrombosis (DVT) or pulmonary embolism (PE). The cut-off for negative predictive value is <0.50 MCGFEU/mL.    POC Troponin I [[id number]]  (Normal) Collected: 11/06/22 1546    Specimen: Blood Updated: 11/06/22 1558     Troponin I 0.00 ng/mL      Comment: Serial Number: [id number]:  [id number]       Urinalysis With Microscopic If Indicated (No Culture) - Urine, Clean Catch [[id number]]  (Normal) Collected: 11/06/22 1548    Specimen: Urine, Clean Catch Updated: 11/06/22 1621     Color, UA Yellow     Appearance, UA Clear     pH, UA 6.5     Specific Gravity, UA 1.028     Glucose, UA Negative     Ketones, UA Negative     Bilirubin, UA Negative     Blood, UA Negative     Protein, UA Negative     Leuk Esterase, UA Negative     Nitrite, UA Negative     Urobilinogen, UA 1.0 E.U./dL    Narrative:      Urine microscopic not indicated.    POC Troponin I with Hold Tube [[id number]] Collected: 11/06/22 1730    Specimen: Blood Updated: 11/06/22 1828    Narrative:      The following orders were created for panel order POC Troponin I with Hold Tube.  Procedure                               Abnormality         Status                     ---------                               -----------         ------                     POC Troponin I[[id number]]                                                              HOLD  Troponin-I Tube[[id number]]                             Final result                 Please view results for these tests on the individual orders.    POC Troponin I [[id number]]  (Normal) Collected: 11/06/22 1737    Specimen: Blood Updated: 11/06/22 1749     Troponin I 0.00 ng/mL      Comment: Serial Number: [id number]:  [id number]              Imaging:  XR Chest 1 View    Result Date: 11/6/2022  PROCEDURE: XR CHEST 1 VW  COMPARISON: [facility], , CHEST AP/PA 1 VIEW, 11/20/2018, 11:40.  INDICATIONS: Chest Pain Triage Protocol  FINDINGS:   The lungs are well-expanded. The heart and pulmonary vasculature are within normal limits. No pleural effusions are identified. There are no active appearing infiltrates.  IMPRESSION: No active disease.  MARCIA BROTHERS MD       Electronically Signed and Approved By: MARCIA BROTHERS MD on 11/06/2022 at 15:55                EKG:  EKG performed at 1514 was interpreted by me to show a normal sinus rhythm with a ventricular rate of 92 bpm.  The P intervals 159 ms.  P waves are normal.  QRS interval is normal.  Axis is leftward -7 degrees.  There is no acute ischemic ST or T wave change identified.  QT corrected was 482 ms.    EKG performed at 1739 was interpreted by me to show a normal sinus rhythm with a ventricular rate of 77 bpm.  The [place] interval is 170 ms.  P waves are normal.  QRS interval is normal.  Axis is leftward -11 degrees.  There is no acute ischemic ST or T wave change identified.  QT corrected was 461 ms.    Procedures:  Procedures    Progress                      The patient was seen and evaluated the ED by me.  The above history and physical examination was performed as document.  The diagnostic data was obtained.  Results reviewed.  Discussed with the patient.  Patient's work-up is negative for an acute cardiac process.  Patient's physical examination consistent with that of chest wall pain.  Patient is a negative D-dimer and she also has a negative  Wells score for DVT of her left leg.  There is no signs of urinary tract infection.  Patient for discharge home.      Medical Decision Making:  MDM     Final diagnoses:   Chest wall pain        Disposition:  ED Disposition     ED Disposition   Discharge    Condition   Stable    Comment   --              Jarrod Reilly Jr.  11/06/22 1832       Carmelo Carpenter DO  11/06/22 2009

## 2022-11-07 LAB — QT INTERVAL: 389 MS

## 2022-11-07 NOTE — DISCHARGE INSTRUCTIONS
Activity as tolerated.  Avoid strenuous activities until better.  Take prescription as prescribed.  Do not take over-the-counter Motrin, Aleve, or similar products if you are taking the prescription.  With your primary care provider in 1 week if symptoms or not better.  Return to the ER for any change or worsening symptoms.

## 2022-11-22 ENCOUNTER — OFFICE VISIT (OUTPATIENT)
Dept: FAMILY MEDICINE CLINIC | Facility: CLINIC | Age: 32
End: 2022-11-22

## 2022-11-22 VITALS
OXYGEN SATURATION: 97 % | BODY MASS INDEX: 39.4 KG/M2 | HEIGHT: 64 IN | SYSTOLIC BLOOD PRESSURE: 131 MMHG | WEIGHT: 230.8 LBS | HEART RATE: 73 BPM | TEMPERATURE: 96.3 F | DIASTOLIC BLOOD PRESSURE: 66 MMHG

## 2022-11-22 DIAGNOSIS — Z86.711 HISTORY OF PULMONARY EMBOLISM: Chronic | ICD-10-CM

## 2022-11-22 DIAGNOSIS — R25.2 LEG CRAMP: Primary | Chronic | ICD-10-CM

## 2022-11-22 DIAGNOSIS — D50.8 IRON DEFICIENCY ANEMIA SECONDARY TO INADEQUATE DIETARY IRON INTAKE: Chronic | ICD-10-CM

## 2022-11-22 DIAGNOSIS — E66.09 CLASS 2 OBESITY DUE TO EXCESS CALORIES WITHOUT SERIOUS COMORBIDITY WITH BODY MASS INDEX (BMI) OF 39.0 TO 39.9 IN ADULT: Chronic | ICD-10-CM

## 2022-11-22 DIAGNOSIS — R76.0 LUPUS ANTICOAGULANT POSITIVE: Chronic | ICD-10-CM

## 2022-11-22 PROCEDURE — 99214 OFFICE O/P EST MOD 30 MIN: CPT | Performed by: FAMILY MEDICINE

## 2022-11-29 ENCOUNTER — TELEPHONE (OUTPATIENT)
Dept: FAMILY MEDICINE CLINIC | Facility: CLINIC | Age: 32
End: 2022-11-29

## 2022-12-07 NOTE — TELEPHONE ENCOUNTER
Patient states the only medication she has tried is ozempic and it makes her very nauseous and causes head aches.  She didn't seem interested in getting samples, asking for new medication to be sent to her pharmacy.

## 2022-12-13 NOTE — TELEPHONE ENCOUNTER
Spoke with Romana from insurance in regards to PA. Needed additional information. Stated turn around time for decision is 24-72 hours    Reference # [id number]

## 2022-12-17 PROBLEM — R76.0 LUPUS ANTICOAGULANT POSITIVE: Status: ACTIVE | Noted: 2021-05-09

## 2022-12-17 PROBLEM — D50.8 IRON DEFICIENCY ANEMIA SECONDARY TO INADEQUATE DIETARY IRON INTAKE: Chronic | Status: ACTIVE | Noted: 2022-12-17

## 2022-12-17 PROBLEM — Z86.711 HISTORY OF PULMONARY EMBOLISM: Chronic | Status: ACTIVE | Noted: 2021-09-28

## 2022-12-17 PROBLEM — Z86.711 HISTORY OF PULMONARY EMBOLISM: Chronic | Status: RESOLVED | Noted: 2021-11-08 | Resolved: 2022-01-11

## 2022-12-17 PROBLEM — R76.0 LUPUS ANTICOAGULANT POSITIVE: Chronic | Status: ACTIVE | Noted: 2021-05-09

## 2022-12-21 ENCOUNTER — OFFICE VISIT (OUTPATIENT)
Dept: FAMILY MEDICINE CLINIC | Facility: CLINIC | Age: 32
End: 2022-12-21

## 2022-12-21 VITALS
HEIGHT: 64 IN | WEIGHT: 223.7 LBS | BODY MASS INDEX: 38.19 KG/M2 | DIASTOLIC BLOOD PRESSURE: 74 MMHG | SYSTOLIC BLOOD PRESSURE: 119 MMHG | HEART RATE: 69 BPM | TEMPERATURE: 97.8 F | OXYGEN SATURATION: 100 %

## 2022-12-21 DIAGNOSIS — E11.9 TYPE 2 DIABETES MELLITUS WITHOUT COMPLICATION, WITHOUT LONG-TERM CURRENT USE OF INSULIN: ICD-10-CM

## 2022-12-21 DIAGNOSIS — E66.01 CLASS 2 SEVERE OBESITY DUE TO EXCESS CALORIES WITH SERIOUS COMORBIDITY AND BODY MASS INDEX (BMI) OF 38.0 TO 38.9 IN ADULT: Primary | Chronic | ICD-10-CM

## 2022-12-21 PROBLEM — E66.09 CLASS 2 OBESITY DUE TO EXCESS CALORIES WITHOUT SERIOUS COMORBIDITY WITH BODY MASS INDEX (BMI) OF 39.0 TO 39.9 IN ADULT: Status: ACTIVE | Noted: 2022-11-01

## 2022-12-21 LAB
B-HCG UR QL: NEGATIVE
EXPIRATION DATE: NORMAL
INTERNAL NEGATIVE CONTROL: NORMAL
INTERNAL POSITIVE CONTROL: NORMAL
Lab: NORMAL

## 2022-12-21 PROCEDURE — 81025 URINE PREGNANCY TEST: CPT | Performed by: FAMILY MEDICINE

## 2022-12-21 PROCEDURE — 99213 OFFICE O/P EST LOW 20 MIN: CPT | Performed by: FAMILY MEDICINE

## 2022-12-21 RX ORDER — ONDANSETRON HYDROCHLORIDE 8 MG/1
8 TABLET, FILM COATED ORAL EVERY 8 HOURS PRN
Qty: 15 TABLET | Refills: 0 | Status: SHIPPED | OUTPATIENT
Start: 2022-12-21

## 2022-12-21 RX ORDER — TIRZEPATIDE 5 MG/.5ML
1 INJECTION, SOLUTION SUBCUTANEOUS WEEKLY
Qty: 2 ML | Refills: 0 | Status: SHIPPED | OUTPATIENT
Start: 2022-12-21

## 2022-12-21 NOTE — PROGRESS NOTES
"Chief Complaint  Follow-up (1 month follow up ) and Weight Check    Subjective        Bing Poe presents to [facility]  History of Present Illness    Follow-up on patient trying to lose weight BMI 38    Have counseled and prescribe medication in the last month or so has been either not covered by insurance too expensive to purchase or failed such as phentermine    HERIBERTO reviewed and labs, counseled on trying to start  Mounjaro given her history of diabetes and improve her overall health with improved weight and BMI closer to goal =/< 30.       Objective   Vital Signs:  /74 (BP Location: Right arm, Patient Position: Sitting)   Pulse 69   Temp 97.8 °F (36.6 °C)   Ht 162.6 cm (64.02\")   Wt 101 kg (223 lb 11.2 oz)   SpO2 100%   BMI 38.38 kg/m²   Estimated body mass index is 38.38 kg/m² as calculated from the following:    Height as of this encounter: 162.6 cm (64.02\").    Weight as of this encounter: 101 kg (223 lb 11.2 oz).    Class 2 Severe Obesity (BMI >=35 and <=39.9). Obesity-related health conditions include the following: hypertension, diabetes mellitus and dyslipidemias. Obesity is unchanged. BMI is is above average; BMI management plan is completed. We discussed portion control, increasing exercise and pharmacologic options including Mounjaro.      Physical Exam  Vitals reviewed.   Constitutional:       Appearance: Normal appearance. She is well-developed.   HENT:      Head: Normocephalic and atraumatic.      Right Ear: External ear normal.      Left Ear: External ear normal.      Nose: Nose normal.   Eyes:      Conjunctiva/sclera: Conjunctivae normal.      Pupils: Pupils are equal, round, and reactive to light.   Cardiovascular:      Rate and Rhythm: Normal rate.   Pulmonary:      Effort: Pulmonary effort is normal.      Breath sounds: Normal breath sounds.   Abdominal:      General: There is no distension.   Skin:     General: Skin is warm and " dry.   Neurological:      General: No focal deficit present.      Mental Status: She is alert and oriented to person, place, and time.   Psychiatric:         Mood and Affect: Mood and affect normal.         Behavior: Behavior normal.         Thought Content: Thought content normal.         Judgment: Judgment normal.        Result Review :  The following data was reviewed by: Ace Merino DO on 12/21/2022:  Common labs    Common Labs 11/1/22 11/1/22 11/1/22 11/6/22 11/6/22    1518 1518 1518 1518 1518   Glucose  79   109 (A)   BUN  9   12   Creatinine  0.71   0.78   Sodium  138   140   Potassium  3.8   3.6   Chloride  103   102   Calcium  8.7   8.8   Albumin  3.80   4.40   Total Bilirubin  0.3   0.4   Alkaline Phosphatase  83   101   AST (SGOT)  17   16   ALT (SGPT)  10   15   WBC 9.42   12.25 (A)    Hemoglobin 13.6   14.4    Hematocrit 41.5   43.3    Platelets 413   420    Total Cholesterol   151     Triglycerides   159 (A)     HDL Cholesterol   39 (A)     LDL Cholesterol    84     (A) Abnormal value                      Assessment and Plan   Diagnoses and all orders for this visit:    1. Class 2 severe obesity due to excess calories with serious comorbidity and body mass index (BMI) of 38.0 to 38.9 in adult ([facility]) (Primary)  -     Tirzepatide (Mounjaro) 5 MG/0.5ML solution pen-injector; Inject 0.5 mL under the skin into the appropriate area as directed 1 (One) Time Per Week.  Dispense: 2 mL; Refill: 0  -     POCT pregnancy, urine  -     Insulin Pen Needle 31G X 6 MM misc; 1 each 1 (One) Time Per Week.  Dispense: 50 each; Refill: 0    2. Type 2 diabetes mellitus without complication, without long-term current use of insulin (HCC)  -     Tirzepatide (Mounjaro) 5 MG/0.5ML solution pen-injector; Inject 0.5 mL under the skin into the appropriate area as directed 1 (One) Time Per Week.  Dispense: 2 mL; Refill: 0  -     Insulin Pen Needle 31G X 6 MM misc; 1 each 1 (One) Time Per Week.  Dispense: 50 each; Refill: 0    Other  orders  -     ondansetron (Zofran) 8 MG tablet; Take 1 tablet by mouth Every 8 (Eight) Hours As Needed for Nausea or Vomiting.  Dispense: 15 tablet; Refill: 0      Samples of medicine provided for once weekly shot  Goal BMI to approach less than 30  Continue diet modifications  Zofran prescribed as needed for nausea related to side effects of weekly injection medicine  Follow-up in a month sooner if still not able to get this medication or alternative, coupon provided additionally to help         Follow Up   Return in about 4 weeks (around 1/18/2023), or if symptoms worsen or fail to improve, for Next scheduled follow up, Recheck, Weight check.  Patient was given instructions and counseling regarding her condition or for health maintenance advice. Please see specific information pulled into the AVS if appropriate.

## 2022-12-27 PROBLEM — E11.9 TYPE 2 DIABETES MELLITUS WITHOUT COMPLICATION, WITHOUT LONG-TERM CURRENT USE OF INSULIN: Status: ACTIVE | Noted: 2022-12-27

## 2023-01-17 PROCEDURE — 87086 URINE CULTURE/COLONY COUNT: CPT | Performed by: NURSE PRACTITIONER

## 2023-01-23 ENCOUNTER — OFFICE VISIT (OUTPATIENT)
Dept: FAMILY MEDICINE CLINIC | Facility: CLINIC | Age: 33
End: 2023-01-23
Payer: COMMERCIAL

## 2023-01-23 VITALS
WEIGHT: 222.9 LBS | HEART RATE: 86 BPM | SYSTOLIC BLOOD PRESSURE: 122 MMHG | BODY MASS INDEX: 38.06 KG/M2 | HEIGHT: 64 IN | DIASTOLIC BLOOD PRESSURE: 79 MMHG | TEMPERATURE: 97.3 F | OXYGEN SATURATION: 99 %

## 2023-01-23 DIAGNOSIS — M54.50 LOW BACK PAIN, UNSPECIFIED BACK PAIN LATERALITY, UNSPECIFIED CHRONICITY, UNSPECIFIED WHETHER SCIATICA PRESENT: ICD-10-CM

## 2023-01-23 DIAGNOSIS — E66.01 CLASS 2 SEVERE OBESITY DUE TO EXCESS CALORIES WITH SERIOUS COMORBIDITY AND BODY MASS INDEX (BMI) OF 38.0 TO 38.9 IN ADULT: Primary | Chronic | ICD-10-CM

## 2023-01-23 LAB
BILIRUB BLD-MCNC: NEGATIVE MG/DL
CLARITY, POC: CLEAR
COLOR UR: YELLOW
EXPIRATION DATE: ABNORMAL
GLUCOSE UR STRIP-MCNC: NEGATIVE MG/DL
KETONES UR QL: NEGATIVE
LEUKOCYTE EST, POC: ABNORMAL
Lab: ABNORMAL
NITRITE UR-MCNC: NEGATIVE MG/ML
PH UR: 6.5 [PH] (ref 5–8)
PROT UR STRIP-MCNC: ABNORMAL MG/DL
RBC # UR STRIP: NEGATIVE /UL
SP GR UR: 1.03 (ref 1–1.03)
UROBILINOGEN UR QL: ABNORMAL

## 2023-01-23 PROCEDURE — 99213 OFFICE O/P EST LOW 20 MIN: CPT | Performed by: FAMILY MEDICINE

## 2023-02-05 PROBLEM — E66.01 CLASS 2 SEVERE OBESITY DUE TO EXCESS CALORIES WITH SERIOUS COMORBIDITY AND BODY MASS INDEX (BMI) OF 38.0 TO 38.9 IN ADULT: Status: ACTIVE | Noted: 2022-11-01

## 2023-03-01 ENCOUNTER — OFFICE VISIT (OUTPATIENT)
Dept: FAMILY MEDICINE CLINIC | Facility: CLINIC | Age: 33
End: 2023-03-01
Payer: COMMERCIAL

## 2023-03-01 VITALS
SYSTOLIC BLOOD PRESSURE: 133 MMHG | RESPIRATION RATE: 14 BRPM | OXYGEN SATURATION: 100 % | HEIGHT: 65 IN | WEIGHT: 215.2 LBS | TEMPERATURE: 98 F | BODY MASS INDEX: 35.85 KG/M2 | DIASTOLIC BLOOD PRESSURE: 86 MMHG | HEART RATE: 68 BPM

## 2023-03-01 DIAGNOSIS — Z00.00 PHYSICAL EXAM, ANNUAL: Primary | ICD-10-CM

## 2023-03-01 DIAGNOSIS — R76.0 LUPUS ANTICOAGULANT POSITIVE: Chronic | ICD-10-CM

## 2023-03-01 DIAGNOSIS — D50.8 IRON DEFICIENCY ANEMIA SECONDARY TO INADEQUATE DIETARY IRON INTAKE: Chronic | ICD-10-CM

## 2023-03-01 DIAGNOSIS — E53.8 B12 DEFICIENCY: ICD-10-CM

## 2023-03-01 DIAGNOSIS — R73.01 IMPAIRED FASTING GLUCOSE: ICD-10-CM

## 2023-03-01 DIAGNOSIS — Z00.00 HEALTHCARE MAINTENANCE: ICD-10-CM

## 2023-03-01 DIAGNOSIS — E66.01 CLASS 2 SEVERE OBESITY DUE TO EXCESS CALORIES WITH SERIOUS COMORBIDITY AND BODY MASS INDEX (BMI) OF 35.0 TO 35.9 IN ADULT: Chronic | ICD-10-CM

## 2023-03-01 PROCEDURE — 96372 THER/PROPH/DIAG INJ SC/IM: CPT | Performed by: FAMILY MEDICINE

## 2023-03-01 PROCEDURE — 3008F BODY MASS INDEX DOCD: CPT | Performed by: FAMILY MEDICINE

## 2023-03-01 PROCEDURE — 99395 PREV VISIT EST AGE 18-39: CPT | Performed by: FAMILY MEDICINE

## 2023-03-01 PROCEDURE — 2014F MENTAL STATUS ASSESS: CPT | Performed by: FAMILY MEDICINE

## 2023-03-01 RX ORDER — CYANOCOBALAMIN 1000 UG/ML
1000 INJECTION, SOLUTION INTRAMUSCULAR; SUBCUTANEOUS
Status: SHIPPED | OUTPATIENT
Start: 2023-03-01

## 2023-03-01 RX ADMIN — CYANOCOBALAMIN 1000 MCG: 1000 INJECTION, SOLUTION INTRAMUSCULAR; SUBCUTANEOUS at 09:22

## 2023-03-01 NOTE — PROGRESS NOTES
"Chief Complaint  Weight Loss (Patient here to review weight loss) and Annual Exam    Subjective        Bing Poe presents to [facility]  History of Present Illness    The patient is a 32-year-old female who presents to the clinic today for follow-up on weight loss and for her annual physical. She has obtained a physical within the last 1 year. She is doing well with medicine for weight management and she is taking it as prescribed. The patient needs to repeat laboratory studies as her last laboratory studies were obtained on 04/11/2022. At that time, her iron was mildly decreased. Her blood counts were stable and her TSH and T4 was within normal limits. She did have an elevated glucose. Her BMI is 35 with a goal of less than 30.     Objective   Vital Signs:  /86   Pulse 68   Temp 98 °F (36.7 °C)   Resp 14   Ht 165.1 cm (65\")   Wt 97.6 kg (215 lb 3.2 oz)   SpO2 100%   BMI 35.81 kg/m²   Estimated body mass index is 35.81 kg/m² as calculated from the following:    Height as of this encounter: 165.1 cm (65\").    Weight as of this encounter: 97.6 kg (215 lb 3.2 oz).       Class 2 Severe Obesity (BMI >=35 and <=39.9). Obesity-related health conditions include the following: GERD and osteoarthritis. Obesity is improving with treatment. BMI is is above average; BMI management plan is completed. We discussed portion control, increasing exercise and pharmacologic options including mounjaro.      Physical Exam  Vitals reviewed.   Constitutional:       Appearance: Normal appearance. She is well-developed.   HENT:      Head: Normocephalic and atraumatic.      Right Ear: External ear normal.      Left Ear: External ear normal.      Nose: Nose normal.   Eyes:      Conjunctiva/sclera: Conjunctivae normal.      Pupils: Pupils are equal, round, and reactive to light.   Cardiovascular:      Rate and Rhythm: Normal rate.   Pulmonary:      Effort: Pulmonary effort is " normal.      Breath sounds: Normal breath sounds.   Abdominal:      General: There is no distension.   Skin:     General: Skin is warm and dry.   Neurological:      General: No focal deficit present.      Mental Status: She is alert and oriented to person, place, and time.   Psychiatric:         Mood and Affect: Mood and affect normal.         Behavior: Behavior normal.         Thought Content: Thought content normal.         Judgment: Judgment normal.        Result Review :  The following data was reviewed by: Ace Merino DO on 03/01/2023:  Common labs    Common Labs 11/1/22 11/1/22 11/1/22 11/6/22 11/6/22    1518 1518 1518 1518 1518   Glucose  79   109 (A)   BUN  9   12   Creatinine  0.71   0.78   Sodium  138   140   Potassium  3.8   3.6   Chloride  103   102   Calcium  8.7   8.8   Albumin  3.80   4.40   Total Bilirubin  0.3   0.4   Alkaline Phosphatase  83   101   AST (SGOT)  17   16   ALT (SGPT)  10   15   WBC 9.42   12.25 (A)    Hemoglobin 13.6   14.4    Hematocrit 41.5   43.3    Platelets 413   420    Total Cholesterol   151     Triglycerides   159 (A)     HDL Cholesterol   39 (A)     LDL Cholesterol    84     (A) Abnormal value                         Assessment and Plan   Diagnoses and all orders for this visit:    1. Physical exam, annual (Primary)    2. Iron deficiency anemia secondary to inadequate dietary iron intake  -     Microalbumin / Creatinine Urine Ratio - Urine, Clean Catch  -     ORDER: Hemoglobin A1c; Future  -     CBC No Differential; Future  -     Comprehensive metabolic panel; Future  -     Lipid panel; Future  -     TSH+Free T4; Future    3. Lupus anticoagulant positive  -     Microalbumin / Creatinine Urine Ratio - Urine, Clean Catch  -     ORDER: Hemoglobin A1c; Future  -     CBC No Differential; Future  -     Comprehensive metabolic panel; Future  -     Lipid panel; Future  -     TSH+Free T4; Future    4. Healthcare maintenance  -     Microalbumin / Creatinine Urine Ratio - Urine,  Clean Catch  -     ORDER: Hemoglobin A1c; Future  -     CBC No Differential; Future  -     Comprehensive metabolic panel; Future  -     Lipid panel; Future  -     TSH+Free T4; Future    5. Impaired fasting glucose  -     Microalbumin / Creatinine Urine Ratio - Urine, Clean Catch  -     ORDER: Hemoglobin A1c; Future    6. Class 2 severe obesity due to excess calories with serious comorbidity and body mass index (BMI) of 35.0 to 35.9 in adult (HCC)      1. Physical  - She is advised and counseled on age appropriate-screenings. Pap smear recommended for her age or follow up with OBGYN. She has a history of anticoagulant issues with pregnancy with an anticoagulant history of MOSES positive for lupus.    2. Weight loss  - The patient is doing well with weight loss and has achieved a weight loss of 7 pounds. Her BMI is approaching 30. Will continue to follow up monthly for weight loss and will attempt to continue Wegovy or Mounjaro for the patient. She is having success, but she did experience significant nausea with 1 mg dosing. Samples were provided. Laboratory studies are ordered.    3. Vitamin B12 deficiency  - B12 injection provided.     4. Anemia  - B12 injection provided.     5. Fatigue  - B12 injection provided.       Reviewed health status and screened for age appropriate conditions, will order labs today to manage, screen and follow up at least yearly    Recommended and reviewed age appropriate immunizations cancer screenings today     Recommend wearing sunscreen and following up on any concerning skin conditions or lesions, wearing seat belts and other risk reduction measures to lessen incident of death, disease or other     Counseled on risks, disease and advice given on screening and continued management of health and condition through the next year until next physical            Follow Up   Return in about 2 months (around 5/1/2023), or if symptoms worsen or fail to improve, for Recheck, Weight check.  Patient  was given instructions and counseling regarding her condition or for health maintenance advice. Please see specific information pulled into the AVS if appropriate.     Transcribed from ambient dictation for Ace Merino DO by Sherrell Saez.  03/01/23   10:58 EST    Patient or patient representative verbalized consent to the visit recording.  I have personally performed the services described in this document as transcribed by the above individual, and it is both accurate and complete.

## 2023-03-03 ENCOUNTER — TELEPHONE (OUTPATIENT)
Dept: FAMILY MEDICINE CLINIC | Facility: CLINIC | Age: 33
End: 2023-03-03
Payer: COMMERCIAL

## 2023-03-03 NOTE — TELEPHONE ENCOUNTER
mounjaro was denied by insurance, passport does not cover any of these types of medications.  Please advise if patient needs alternative.

## 2023-04-19 ENCOUNTER — HOSPITAL ENCOUNTER (OUTPATIENT)
Dept: CARDIOLOGY | Facility: HOSPITAL | Age: 33
Discharge: HOME | End: 2023-04-19
Admitting: FAMILY MEDICINE
Payer: COMMERCIAL

## 2023-04-19 DIAGNOSIS — R25.2 LEG CRAMP: ICD-10-CM

## 2023-04-19 DIAGNOSIS — R76.0 LUPUS ANTICOAGULANT POSITIVE: Primary | ICD-10-CM

## 2023-04-19 DIAGNOSIS — Z86.711 HISTORY OF PULMONARY EMBOLISM: ICD-10-CM

## 2023-04-19 LAB

## 2023-04-19 PROCEDURE — 93970 EXTREMITY STUDY: CPT

## 2023-04-26 ENCOUNTER — OFFICE VISIT (OUTPATIENT)
Dept: FAMILY MEDICINE CLINIC | Facility: CLINIC | Age: 33
End: 2023-04-26
Payer: COMMERCIAL

## 2023-04-26 ENCOUNTER — LAB (OUTPATIENT)
Dept: LAB | Facility: HOSPITAL | Age: 33
End: 2023-04-26
Payer: COMMERCIAL

## 2023-04-26 VITALS
DIASTOLIC BLOOD PRESSURE: 79 MMHG | SYSTOLIC BLOOD PRESSURE: 120 MMHG | TEMPERATURE: 97.7 F | BODY MASS INDEX: 37.22 KG/M2 | HEART RATE: 83 BPM | HEIGHT: 65 IN | WEIGHT: 223.4 LBS | OXYGEN SATURATION: 100 %

## 2023-04-26 DIAGNOSIS — R76.0 LUPUS ANTICOAGULANT POSITIVE: Chronic | ICD-10-CM

## 2023-04-26 DIAGNOSIS — M79.605 PAIN OF LEFT LOWER EXTREMITY: ICD-10-CM

## 2023-04-26 DIAGNOSIS — D50.8 IRON DEFICIENCY ANEMIA SECONDARY TO INADEQUATE DIETARY IRON INTAKE: Chronic | ICD-10-CM

## 2023-04-26 DIAGNOSIS — Z13.29 SCREENING FOR THYROID DISORDER: ICD-10-CM

## 2023-04-26 DIAGNOSIS — Z00.00 HEALTHCARE MAINTENANCE: ICD-10-CM

## 2023-04-26 DIAGNOSIS — R73.9 ELEVATED BLOOD SUGAR: ICD-10-CM

## 2023-04-26 DIAGNOSIS — R82.998 LEUKOCYTES IN URINE: ICD-10-CM

## 2023-04-26 DIAGNOSIS — Z20.2 POSSIBLE EXPOSURE TO STD: ICD-10-CM

## 2023-04-26 DIAGNOSIS — R35.0 URINARY FREQUENCY: Primary | ICD-10-CM

## 2023-04-26 DIAGNOSIS — R73.01 IMPAIRED FASTING GLUCOSE: ICD-10-CM

## 2023-04-26 LAB
ALBUMIN SERPL-MCNC: 3.8 G/DL (ref 3.5–5.2)
ALBUMIN UR-MCNC: <1.2 MG/DL
ALBUMIN/GLOB SERPL: 1.3 G/DL
ALP SERPL-CCNC: 79 U/L (ref 39–117)
ALT SERPL W P-5'-P-CCNC: 19 U/L (ref 1–33)
ANION GAP SERPL CALCULATED.3IONS-SCNC: 9.5 MMOL/L (ref 5–15)
AST SERPL-CCNC: 19 U/L (ref 1–32)
BASOPHILS # BLD AUTO: 0.05 10*3/MM3 (ref 0–0.2)
BASOPHILS NFR BLD AUTO: 0.6 % (ref 0–1.5)
BILIRUB BLD-MCNC: NEGATIVE MG/DL
BILIRUB SERPL-MCNC: 0.4 MG/DL (ref 0–1.2)
BUN SERPL-MCNC: 10 MG/DL (ref 6–20)
BUN/CREAT SERPL: 12.5 (ref 7–25)
C TRACH RRNA CVX QL NAA+PROBE: NOT DETECTED
CALCIUM SPEC-SCNC: 9 MG/DL (ref 8.6–10.5)
CANDIDA SPECIES: NEGATIVE
CHLORIDE SERPL-SCNC: 105 MMOL/L (ref 98–107)
CHOLEST SERPL-MCNC: 130 MG/DL (ref 0–200)
CLARITY, POC: CLEAR
CO2 SERPL-SCNC: 25.5 MMOL/L (ref 22–29)
COLOR UR: YELLOW
CREAT SERPL-MCNC: 0.8 MG/DL (ref 0.57–1)
CREAT UR-MCNC: 149.8 MG/DL
DEPRECATED RDW RBC AUTO: 39.9 FL (ref 37–54)
EGFRCR SERPLBLD CKD-EPI 2021: 100.5 ML/MIN/1.73
EOSINOPHIL # BLD AUTO: 0.21 10*3/MM3 (ref 0–0.4)
EOSINOPHIL NFR BLD AUTO: 2.6 % (ref 0.3–6.2)
ERYTHROCYTE [DISTWIDTH] IN BLOOD BY AUTOMATED COUNT: 13 % (ref 12.3–15.4)
EXPIRATION DATE: ABNORMAL
GARDNERELLA VAGINALIS: POSITIVE
GLOBULIN UR ELPH-MCNC: 3 GM/DL
GLUCOSE SERPL-MCNC: 83 MG/DL (ref 65–99)
GLUCOSE UR STRIP-MCNC: NEGATIVE MG/DL
HBA1C MFR BLD: 5.1 % (ref 4.8–5.6)
HCT VFR BLD AUTO: 41.8 % (ref 34–46.6)
HDLC SERPL-MCNC: 31 MG/DL (ref 40–60)
HGB BLD-MCNC: 13.9 G/DL (ref 12–15.9)
IMM GRANULOCYTES # BLD AUTO: 0.03 10*3/MM3 (ref 0–0.05)
IMM GRANULOCYTES NFR BLD AUTO: 0.4 % (ref 0–0.5)
KETONES UR QL: NEGATIVE
LDLC SERPL CALC-MCNC: 79 MG/DL (ref 0–100)
LDLC/HDLC SERPL: 2.5 {RATIO}
LEUKOCYTE EST, POC: ABNORMAL
LYMPHOCYTES # BLD AUTO: 2.96 10*3/MM3 (ref 0.7–3.1)
LYMPHOCYTES NFR BLD AUTO: 36.6 % (ref 19.6–45.3)
Lab: ABNORMAL
MAGNESIUM SERPL-MCNC: 1.8 MG/DL (ref 1.6–2.6)
MCH RBC QN AUTO: 28.5 PG (ref 26.6–33)
MCHC RBC AUTO-ENTMCNC: 33.3 G/DL (ref 31.5–35.7)
MCV RBC AUTO: 85.8 FL (ref 79–97)
MICROALBUMIN/CREAT UR: NORMAL MG/G{CREAT}
MONOCYTES # BLD AUTO: 0.37 10*3/MM3 (ref 0.1–0.9)
MONOCYTES NFR BLD AUTO: 4.6 % (ref 5–12)
N GONORRHOEA RRNA SPEC QL NAA+PROBE: NOT DETECTED
NEUTROPHILS NFR BLD AUTO: 4.46 10*3/MM3 (ref 1.7–7)
NEUTROPHILS NFR BLD AUTO: 55.2 % (ref 42.7–76)
NITRITE UR-MCNC: NEGATIVE MG/ML
NRBC BLD AUTO-RTO: 0 /100 WBC (ref 0–0.2)
PH UR: 6 [PH] (ref 5–8)
PLATELET # BLD AUTO: 393 10*3/MM3 (ref 140–450)
PMV BLD AUTO: 9.7 FL (ref 6–12)
POTASSIUM SERPL-SCNC: 4.2 MMOL/L (ref 3.5–5.2)
PROT SERPL-MCNC: 6.8 G/DL (ref 6–8.5)
PROT UR STRIP-MCNC: NEGATIVE MG/DL
RBC # BLD AUTO: 4.87 10*6/MM3 (ref 3.77–5.28)
RBC # UR STRIP: ABNORMAL /UL
SODIUM SERPL-SCNC: 140 MMOL/L (ref 136–145)
SP GR UR: 1.02 (ref 1–1.03)
T VAGINALIS DNA VAG QL PROBE+SIG AMP: NEGATIVE
T4 FREE SERPL-MCNC: 1.27 NG/DL (ref 0.93–1.7)
TRIGL SERPL-MCNC: 108 MG/DL (ref 0–150)
TSH SERPL DL<=0.05 MIU/L-ACNC: 0.92 UIU/ML (ref 0.27–4.2)
UROBILINOGEN UR QL: ABNORMAL
VLDLC SERPL-MCNC: 20 MG/DL (ref 5–40)
WBC NRBC COR # BLD: 8.08 10*3/MM3 (ref 3.4–10.8)

## 2023-04-26 PROCEDURE — 82570 ASSAY OF URINE CREATININE: CPT | Performed by: FAMILY MEDICINE

## 2023-04-26 PROCEDURE — 84443 ASSAY THYROID STIM HORMONE: CPT

## 2023-04-26 PROCEDURE — 80061 LIPID PANEL: CPT

## 2023-04-26 PROCEDURE — 83735 ASSAY OF MAGNESIUM: CPT

## 2023-04-26 PROCEDURE — 84439 ASSAY OF FREE THYROXINE: CPT

## 2023-04-26 PROCEDURE — 80053 COMPREHEN METABOLIC PANEL: CPT

## 2023-04-26 PROCEDURE — 87086 URINE CULTURE/COLONY COUNT: CPT

## 2023-04-26 PROCEDURE — 87660 TRICHOMONAS VAGIN DIR PROBE: CPT

## 2023-04-26 PROCEDURE — 87510 GARDNER VAG DNA DIR PROBE: CPT

## 2023-04-26 PROCEDURE — 87591 N.GONORRHOEAE DNA AMP PROB: CPT

## 2023-04-26 PROCEDURE — 83036 HEMOGLOBIN GLYCOSYLATED A1C: CPT

## 2023-04-26 PROCEDURE — 87480 CANDIDA DNA DIR PROBE: CPT

## 2023-04-26 PROCEDURE — 85025 COMPLETE CBC W/AUTO DIFF WBC: CPT

## 2023-04-26 PROCEDURE — 36415 COLL VENOUS BLD VENIPUNCTURE: CPT

## 2023-04-26 PROCEDURE — 82043 UR ALBUMIN QUANTITATIVE: CPT | Performed by: FAMILY MEDICINE

## 2023-04-26 PROCEDURE — 87491 CHLMYD TRACH DNA AMP PROBE: CPT

## 2023-04-26 RX ORDER — CYCLOBENZAPRINE HCL 10 MG
10 TABLET ORAL 3 TIMES DAILY PRN
Qty: 21 TABLET | Refills: 0 | Status: SHIPPED | OUTPATIENT
Start: 2023-04-26 | End: 2023-05-03

## 2023-04-27 DIAGNOSIS — N76.0 BACTERIAL VAGINOSIS: Primary | ICD-10-CM

## 2023-04-27 DIAGNOSIS — B96.89 BACTERIAL VAGINOSIS: Primary | ICD-10-CM

## 2023-04-27 RX ORDER — METRONIDAZOLE 500 MG/1
500 TABLET ORAL 3 TIMES DAILY
Qty: 21 TABLET | Refills: 0 | Status: SHIPPED | OUTPATIENT
Start: 2023-04-27 | End: 2023-05-04

## 2023-04-27 NOTE — PROGRESS NOTES
Chief Complaint   Patient presents with   • Leg Pain     Having left leg pain went to Urgent Care on the 19th, isn't any better.    • Vaginal Discharge     Has been having creamy color vaginal discharge. Urinary leakage and having to wear panty liners.     • Urinary Frequency     Urinates a lot but also has been trying to increase her water intake.    • Weight Loss     Hasn't been able to get the Mounjaro from pharmacy wants to see if sample is available.        Subjective          Bing Poe presents to [facility]    History of Present Illness  She is here today to be seen for vaginal discharge that is creamy in color and increased. She has urinary frequency as well.     She is Dr Merino's patient and has been having left leg pain since the 19th. She had an ultrasound done of that leg and it was negative for blood clots. I will order labs on her today to assess further. She is also going to be prescribed flexeril for the pain as I do believe this is muscular in nature.       Past History:  Medical History: has a past medical history of ADHD (11/22/2021), Asthma, History of pulmonary embolism, Lupus anticoagulant positive, Migraines, and Urinary incontinence (03/18/2021).   Surgical History: has no past surgical history on file.   Family History: family history includes Diabetes in her maternal aunt and mother; Heart disease in an other family member.   Social History: reports that she has never smoked. She has never been exposed to tobacco smoke. She has never used smokeless tobacco. She reports current alcohol use. She reports that she does not use drugs.  Allergies: Sulfamethoxazole-trimethoprim and Heparin  (Not in a hospital admission)       Social History     Socioeconomic History   • Marital status:      Spouse name: Mica    • Number of children: 4   • Years of education: 12   • Highest education level: High school graduate   Tobacco Use   • Smoking  "status: Never     Passive exposure: Never   • Smokeless tobacco: Never   Vaping Use   • Vaping Use: Never used   Substance and Sexual Activity   • Alcohol use: Yes     Comment: occasional   • Drug use: Never   • Sexual activity: Yes     Partners: Male       There are no preventive care reminders to display for this patient.    Objective     Vital Signs:   /79 (BP Location: Left arm, Patient Position: Sitting)   Pulse 83   Temp 97.7 °F (36.5 °C) (Infrared)   Ht 165.1 cm (65\")   Wt 101 kg (223 lb 6.4 oz)   SpO2 100%   BMI 37.18 kg/m²       Physical Exam  Constitutional:       Appearance: Normal appearance.   HENT:      Nose: Nose normal.      Mouth/Throat:      Mouth: Mucous membranes are moist.   Cardiovascular:      Rate and Rhythm: Normal rate and regular rhythm.      Pulses: Normal pulses.      Heart sounds: Normal heart sounds.   Pulmonary:      Effort: Pulmonary effort is normal.      Breath sounds: Normal breath sounds.   Musculoskeletal:         General: Tenderness present.   Skin:     General: Skin is warm and dry.   Neurological:      General: No focal deficit present.      Mental Status: She is alert and oriented to person, place, and time.   Psychiatric:         Mood and Affect: Mood normal.         Behavior: Behavior normal.          Review of Systems   All other systems reviewed and are negative.       Result Review :                 Assessment and Plan    Diagnoses and all orders for this visit:    1. Urinary frequency (Primary)  -     POCT urinalysis dipstick, automated    2. Possible exposure to STD  -     Gardnerella vaginalis, Trichomonas vaginalis, Candida albicans, DNA - Swab, Vagina  -     Chlamydia trachomatis, Neisseria gonorrhoeae, PCR - Urine, Urine, Random Void; Future  -     Chlamydia trachomatis, Neisseria gonorrhoeae, PCR - Urine, Urine, Random Void    3. Leukocytes in urine  -     Urine Culture - Urine, Urine, Clean Catch; Future  -     Urine Culture - Urine, Urine, Clean " Catch    4. Pain of left lower extremity  -     CBC w AUTO Differential; Future  -     Comprehensive metabolic panel; Future  -     Magnesium; Future  -     cyclobenzaprine (FLEXERIL) 10 MG tablet; Take 1 tablet by mouth 3 (Three) Times a Day As Needed for Muscle Spasms for up to 7 days.  Dispense: 21 tablet; Refill: 0    5. Screening for thyroid disorder  -     TSH+Free T4; Future    6. Elevated blood sugar  -     Hemoglobin A1c; Future          Pt thought to be clinically stable at this time.    Follow Up   No follow-ups on file.  Patient was given instructions and counseling regarding her condition or for health maintenance advice. Please see specific information pulled into the AVS if appropriate.

## 2023-04-28 LAB — BACTERIA SPEC AEROBE CULT: NORMAL

## 2023-05-05 ENCOUNTER — OFFICE VISIT (OUTPATIENT)
Dept: FAMILY MEDICINE CLINIC | Facility: CLINIC | Age: 33
End: 2023-05-05
Payer: COMMERCIAL

## 2023-05-05 VITALS
OXYGEN SATURATION: 100 % | HEART RATE: 93 BPM | BODY MASS INDEX: 38.24 KG/M2 | HEIGHT: 64 IN | SYSTOLIC BLOOD PRESSURE: 137 MMHG | WEIGHT: 224 LBS | DIASTOLIC BLOOD PRESSURE: 80 MMHG | RESPIRATION RATE: 14 BRPM | TEMPERATURE: 98.2 F

## 2023-05-05 DIAGNOSIS — E66.09 CLASS 2 OBESITY DUE TO EXCESS CALORIES WITHOUT SERIOUS COMORBIDITY WITH BODY MASS INDEX (BMI) OF 38.0 TO 38.9 IN ADULT: Chronic | ICD-10-CM

## 2023-05-05 DIAGNOSIS — E11.9 TYPE 2 DIABETES MELLITUS WITHOUT COMPLICATION, WITHOUT LONG-TERM CURRENT USE OF INSULIN: Chronic | ICD-10-CM

## 2023-05-05 DIAGNOSIS — M25.372 LEFT ANKLE INSTABILITY: Primary | ICD-10-CM

## 2023-05-05 DIAGNOSIS — B07.9 WART OF HAND: ICD-10-CM

## 2023-05-05 LAB — GLUCOSE BLDC GLUCOMTR-MCNC: 150 MG/DL (ref 70–130)

## 2023-05-05 RX ORDER — TIRZEPATIDE 2.5 MG/.5ML
0.5 INJECTION, SOLUTION SUBCUTANEOUS WEEKLY
Qty: 2 ML | Refills: 0 | Status: SHIPPED | OUTPATIENT
Start: 2023-05-05

## 2023-05-05 NOTE — PROGRESS NOTES
"Chief Complaint  Pain (Leg pain) and Follow-up (Venous doppler)    Subjective        Bing Poe presents to [facility]  History of Present Illness    Presents to follow up on recently having venous doppler to evaluate leg cramps, pain and complaints in lower extremity with concerns for DVT or other given history of lupus anticoagulant and recommendation she need anticoagulation in the past regarding condition, at least daily aspirin. Study was negative, this is the 2nd time ordered and performed for complaint and reviewed past labs and considering repeating studies to further investigate.     She has chronic conditions otherwise including obesity bmi 38, diabetes type 2 non insulin dependent and chronic ankle instability that is giving her chronic issues in the same leg as the cramps which are better but still bothersome and daily.     Objective   Vital Signs:  /80   Pulse 93   Temp 98.2 °F (36.8 °C)   Resp 14   Ht 162.6 cm (64\")   Wt 102 kg (224 lb)   SpO2 100%   BMI 38.45 kg/m²   Estimated body mass index is 38.45 kg/m² as calculated from the following:    Height as of this encounter: 162.6 cm (64\").    Weight as of this encounter: 102 kg (224 lb).       Class 2 Severe Obesity (BMI >=35 and <=39.9). Obesity-related health conditions include the following: diabetes mellitus, dyslipidemias, GERD and osteoarthritis. Obesity is unchanged. BMI is is above average; BMI management plan is completed. We discussed portion control, increasing exercise and pharmacologic options including injection weekly to treat.      Physical Exam  Vitals reviewed.   Constitutional:       Appearance: Normal appearance. She is well-developed.   HENT:      Head: Normocephalic and atraumatic.      Right Ear: External ear normal.      Left Ear: External ear normal.      Nose: Nose normal.   Eyes:      Conjunctiva/sclera: Conjunctivae normal.      Pupils: Pupils are equal, round, " and reactive to light.   Cardiovascular:      Rate and Rhythm: Normal rate.   Pulmonary:      Effort: Pulmonary effort is normal.      Breath sounds: Normal breath sounds.   Abdominal:      General: There is no distension.   Musculoskeletal:      Left ankle: Tenderness present. Decreased range of motion.      Comments: Unstable ankle   Skin:     General: Skin is warm and dry.      Comments: Large plantar wart on finger of left hand I believe, raised tender and size is equal to pencil eraser around or larger similar raised size to pencil eraser   Neurological:      Mental Status: She is alert and oriented to person, place, and time. Mental status is at baseline.   Psychiatric:         Mood and Affect: Mood and affect normal.         Behavior: Behavior normal.         Thought Content: Thought content normal.         Judgment: Judgment normal.        Result Review :  The following data was reviewed by: Ace Merino DO on 05/05/2023:  Common labs        11/1/2022    15:18 11/6/2022    15:18 4/26/2023    11:23 4/26/2023    11:30   Common Labs   Glucose 79   109    83     BUN 9   12    10     Creatinine 0.71   0.78    0.80     Sodium 138   140    140     Potassium 3.8   3.6    4.2     Chloride 103   102    105     Calcium 8.7   8.8    9.0     Albumin 3.80   4.40    3.8     Total Bilirubin 0.3   0.4    0.4     Alkaline Phosphatase 83   101    79     AST (SGOT) 17   16    19     ALT (SGPT) 10   15    19     WBC 9.42   12.25    8.08     Hemoglobin 13.6   14.4    13.9     Hematocrit 41.5   43.3    41.8     Platelets 413   420    393     Total Cholesterol 151     130     Triglycerides 159     108     HDL Cholesterol 39     31     LDL Cholesterol  84     79     Hemoglobin A1C    5.10     Microalbumin, Urine   <1.2                     Assessment and Plan   Diagnoses and all orders for this visit:    1. Left ankle instability (Primary)  Assessment & Plan:  Referral to podiatry, counseled on ankle support or brace to wear at home  additionally, avoiding certain activities    Orders:  -     Ambulatory Referral to Podiatry    2. Wart of hand  Assessment & Plan:  Referral to dermatology given location size and failure of outpatient therapy and over the counter therapy, appreciate assistance in treating    Orders:  -     Ambulatory Referral to Dermatology    3. Class 2 obesity due to excess calories without serious comorbidity with body mass index (BMI) of 38.0 to 38.9 in adult  Assessment & Plan:  Patient's (Body mass index is 38.45 kg/m².) indicates that they are morbidly/severely obese (BMI > 40 or > 35 with obesity - related health condition) with health conditions that include diabetes mellitus, dyslipidemias and osteoarthritis . Weight is unchanged. BMI  is above average; BMI management plan is completed. We discussed portion control, increasing exercise and weekly injection prescribed .     Orders:  -     Tirzepatide (Mounjaro) 2.5 MG/0.5ML solution pen-injector; Inject 0.5 mL under the skin into the appropriate area as directed 1 (One) Time Per Week.  Dispense: 2 mL; Refill: 0    4. Type 2 diabetes mellitus without complication, without long-term current use of insulin  Assessment & Plan:  Diabetes is newly identified.   Dietary recommendations for ADA diet.  Regular aerobic exercise.  medicine prescribed and diet, weight loss recommended  Diabetes will be reassessed in 1 month   Fasting glucose 150, monitor at home    Orders:  -     POCT Glucose           Follow Up   Return in about 4 weeks (around 6/2/2023), or if symptoms worsen or fail to improve.  Patient was given instructions and counseling regarding her condition or for health maintenance advice. Please see specific information pulled into the AVS if appropriate.

## 2023-05-21 PROBLEM — B07.9 WART OF HAND: Status: ACTIVE | Noted: 2023-05-21

## 2023-05-21 PROBLEM — R25.2 LEG CRAMP: Status: RESOLVED | Noted: 2022-11-22 | Resolved: 2023-05-21

## 2023-05-21 PROBLEM — E11.9 TYPE 2 DIABETES MELLITUS WITHOUT COMPLICATION, WITHOUT LONG-TERM CURRENT USE OF INSULIN: Chronic | Status: ACTIVE | Noted: 2022-12-27

## 2023-05-21 PROBLEM — M25.372 LEFT ANKLE INSTABILITY: Chronic | Status: ACTIVE | Noted: 2023-05-05

## 2023-05-21 NOTE — ASSESSMENT & PLAN NOTE
Referral to podiatry, counseled on ankle support or brace to wear at home additionally, avoiding certain activities

## 2023-05-21 NOTE — ASSESSMENT & PLAN NOTE
Referral to dermatology given location size and failure of outpatient therapy and over the counter therapy, appreciate assistance in treating

## 2023-05-21 NOTE — ASSESSMENT & PLAN NOTE
Patient's (Body mass index is 38.45 kg/m².) indicates that they are morbidly/severely obese (BMI > 40 or > 35 with obesity - related health condition) with health conditions that include diabetes mellitus, dyslipidemias and osteoarthritis . Weight is unchanged. BMI  is above average; BMI management plan is completed. We discussed portion control, increasing exercise and weekly injection prescribed .

## 2023-05-21 NOTE — ASSESSMENT & PLAN NOTE
Diabetes is newly identified.   Dietary recommendations for ADA diet.  Regular aerobic exercise.  medicine prescribed and diet, weight loss recommended  Diabetes will be reassessed in 1 month   Fasting glucose 150, monitor at home

## 2023-05-23 ENCOUNTER — TELEMEDICINE (OUTPATIENT)
Dept: FAMILY MEDICINE CLINIC | Facility: TELEHEALTH | Age: 33
End: 2023-05-23
Payer: COMMERCIAL

## 2023-05-23 DIAGNOSIS — K52.9 ACUTE GASTROENTERITIS: Primary | ICD-10-CM

## 2023-05-23 RX ORDER — PROMETHAZINE HYDROCHLORIDE 25 MG/1
25 SUPPOSITORY RECTAL EVERY 6 HOURS PRN
Qty: 6 SUPPOSITORY | Refills: 0 | Status: SHIPPED | OUTPATIENT
Start: 2023-05-23 | End: 2023-05-25

## 2023-05-23 NOTE — PROGRESS NOTES
CHIEF COMPLAINT  Chief Complaint   Patient presents with    GI Problem         HPI  Bing Poe is a 32 y.o. female  presents with complaint of nausea, vomiting and diarrhea that started Sunday evening. She is getting better, but is still having some intermittent nausea and vomiting and has had 2 watery stools. The Zofran seem to make her vomit and needs something else.   She reports her children had similar symptoms last week.     Review of Systems   Constitutional:  Positive for chills and fatigue. Negative for diaphoresis and fever.   HENT:  Negative for congestion, postnasal drip, sinus pressure, sinus pain and sore throat.    Respiratory:  Negative for cough.    Gastrointestinal:  Positive for diarrhea, nausea and vomiting.   Musculoskeletal:  Negative for myalgias.   Neurological:  Positive for headaches.     Past Medical History:   Diagnosis Date    ADHD 11/22/2021    Asthma     History of pulmonary embolism     on OCPs    Lupus anticoagulant positive     Migraines     Urinary incontinence 03/18/2021       Family History   Problem Relation Age of Onset    Diabetes Mother     Diabetes Maternal Aunt     Heart disease Other        Social History     Socioeconomic History    Marital status:      Spouse name: Mica     Number of children: 4    Years of education: 12    Highest education level: High school graduate   Tobacco Use    Smoking status: Never     Passive exposure: Never    Smokeless tobacco: Never   Vaping Use    Vaping Use: Never used   Substance and Sexual Activity    Alcohol use: Yes     Comment: occasional    Drug use: Never    Sexual activity: Yes     Partners: Male       Bing Poe  reports that she has never smoked. She has never been exposed to tobacco smoke. She has never used smokeless tobacco.  LMP 04/25/2023 (Approximate)   Breastfeeding No     PHYSICAL EXAM  Physical Exam   Constitutional: She is oriented to person, place, and time. She appears  well-developed and well-nourished. She does not have a sickly appearance. She does not appear ill. No distress.   HENT:   Head: Normocephalic and atraumatic.   Eyes: EOM are normal.   Neck: Neck normal appearance.  Pulmonary/Chest: Effort normal.  No respiratory distress.  Neurological: She is alert and oriented to person, place, and time.   Skin: Skin is dry.   Psychiatric: She has a normal mood and affect.         Diagnoses and all orders for this visit:    1. Acute gastroenteritis (Primary)    Other orders  -     promethazine (PHENERGAN) 25 MG suppository; Insert 1 suppository into the rectum Every 6 (Six) Hours As Needed for Nausea or Vomiting for up to 2 days.  Dispense: 6 suppository; Refill: 0        The use of a video visit has been reviewed with the patient and verbal informed consent has been obtained. Myself and Bing Poe participated in this visit. The patient is located in [address] [place] [address]. I am located in [place], [place]. Mychart and Twilio were utilized.       Note Disclaimer: At [facility], we believe that sharing information builds trust and better   relationships. You are receiving this note because you recently visited [facility]. It is possible you   will see health information before a provider has talked with you about it. This kind of information can   be easy to misunderstand. To help you fully understand what it means for your health, we urge you to   discuss this note with your provider.    OBI Ramos  05/23/2023  14:19 EDT

## 2023-05-23 NOTE — LETTER
May 23, 2023     Patient: Bing Poe   YOB: 1990   Date of Visit: 5/23/2023       To Whom It May Concern:    It is my medical opinion that Bing Poe may return to work on Wednesday 5/24/2023. She will need to be excused for Monday 5/22/2023 and Tuesday 5/23/2023.       Sincerely,    OBI Ramos     URGENT CARE VIDEO VISIT PROVIDER    CC: No Recipients

## 2023-05-23 NOTE — PATIENT INSTRUCTIONS
Drink plenty of water  Clear liquid diet until no longer vomiting. Stay away from fatty and fried foods and milk products until diarrhea resolves.   If symptoms do not improve in 3 days follow up with your primary care provider or urgent care  If you develop abdominal pain or more severe symptoms, go to the emergency department.        Viral Gastroenteritis, Adult  Viral gastroenteritis is also known as the stomach flu. This condition may affect your stomach, your small intestine, and your large intestine. It can cause sudden watery poop (diarrhea), fever, and vomiting. This condition is caused by certain germs (viruses). These germs can be passed from person to person very easily (are contagious).  Having watery poop and vomiting can make you feel weak and cause you to not have enough water in your body (get dehydrated). This can make you tired and thirsty, make you have a dry mouth, and make it so you pee (urinate) less often. It is important to replace the fluids that you lose from having watery poop and vomiting.  What are the causes?  You can get sick by catching germs from other people.  You can also get sick by:  Eating food, drinking water, or touching a surface that has the germs on it (is contaminated).  Sharing utensils or other personal items with a person who is sick.  What increases the risk?  Having a weak body defense system (immune system).  Living with one or more children who are younger than 2 years.  Living in a nursing home.  Going on cruise ships.  What are the signs or symptoms?  Symptoms of this condition start suddenly. Symptoms may last for a few days or for as long as a week.  Common symptoms include:  Watery poop.  Vomiting.  Other symptoms include:  Fever.  Headache.  Feeling tired (fatigue).  Pain in the belly (abdomen).  Chills.  Feeling weak.  Feeling like you may vomit (nauseous).  Muscle aches.  Not feeling hungry.  How is this treated?  This condition typically goes away on its  own. The focus of treatment is to replace the fluids that you lose. This condition may be treated with:  An ORS (oral rehydration solution). This is a drink that helps you replace fluids and minerals your body lost. It is sold at pharmacies and stores.  Medicines to help with your symptoms.  Probiotic supplements to reduce symptoms of watery poop.  Fluids given through an IV tube, if needed.  Older adults and people with other diseases or a weak body defense system are at higher risk for not having enough water in the body.  Follow these instructions at home:  Eating and drinking  Take an ORS as told by your doctor.  Drink clear fluids in small amounts as you are able. Clear fluids include:  Water.  Ice chips.  Fruit juice that has water added to it (is diluted).  Low-calorie sports drinks.  Drink enough fluid to keep your pee (urine) pale yellow.  Eat small amounts of healthy foods every 3-4 hours as you are able. This may include whole grains, fruits, vegetables, lean meats, and yogurt.  Avoid fluids that have a lot of sugar or caffeine in them. This includes energy drinks, sports drinks, and soda.  Avoid spicy or fatty foods.  Avoid alcohol.  General instructions  Wash your hands often. This is very important after you have watery poop or you vomit. If you cannot use soap and water, use hand .  Make sure that all people in your home wash their hands well and often.  Take over-the-counter and prescription medicines only as told by your doctor.  Rest at home while you get better.  Watch your condition for any changes.  Take a warm bath to help with any burning or pain from having watery poop.  Keep all follow-up visits.  Contact a doctor if:  You cannot keep fluids down.  Your symptoms get worse.  You have new symptoms.  You feel light-headed or dizzy.  You have muscle cramps.  Get help right away if:  You have chest pain.  You have trouble breathing, or you are breathing very fast.  You have a fast  heartbeat.  You feel very weak or you faint.  You have a very bad headache, a stiff neck, or both.  You have a rash.  You have very bad pain, cramping, or bloating in your belly.  Your skin feels cold and clammy.  You feel mixed up (confused).  You have pain when you pee.  You have signs of not having enough water in the body, such as:  Dark pee, hardly any pee, or no pee.  Cracked lips.  Dry mouth.  Sunken eyes.  Feeling very sleepy.  Feeling weak.  You have signs of bleeding, such as:  You see blood in your vomit.  Your vomit looks like coffee grounds.  You have bloody or black poop or poop that looks like tar.  These symptoms may be an emergency. Get help right away. Call 911.  Do not wait to see if the symptoms will go away.  Do not drive yourself to the hospital.  Summary  Viral gastroenteritis is also known as the stomach flu.  This condition can cause sudden watery poop (diarrhea), fever, and vomiting.  These germs can be passed from person to person very easily.  Take an ORS (oral rehydration solution) as told by your doctor. This is a drink that is sold at pharmacies and stores.  Wash your hands often, especially after having watery poop or vomiting. If you cannot use soap and water, use hand .  This information is not intended to replace advice given to you by your health care provider. Make sure you discuss any questions you have with your health care provider.  Document Revised: 10/17/2022 Document Reviewed: 10/17/2022  Elsevier Patient Education © 2022 [employer]

## 2023-06-05 ENCOUNTER — OFFICE VISIT (OUTPATIENT)
Dept: FAMILY MEDICINE CLINIC | Facility: CLINIC | Age: 33
End: 2023-06-05
Payer: COMMERCIAL

## 2023-06-05 VITALS
BODY MASS INDEX: 37.58 KG/M2 | TEMPERATURE: 98.6 F | OXYGEN SATURATION: 99 % | SYSTOLIC BLOOD PRESSURE: 127 MMHG | HEIGHT: 64 IN | RESPIRATION RATE: 14 BRPM | DIASTOLIC BLOOD PRESSURE: 97 MMHG | WEIGHT: 220.1 LBS | HEART RATE: 67 BPM

## 2023-06-05 DIAGNOSIS — E66.09 CLASS 2 OBESITY DUE TO EXCESS CALORIES WITHOUT SERIOUS COMORBIDITY WITH BODY MASS INDEX (BMI) OF 38.0 TO 38.9 IN ADULT: Chronic | ICD-10-CM

## 2023-06-05 RX ORDER — TIRZEPATIDE 2.5 MG/.5ML
0.5 INJECTION, SOLUTION SUBCUTANEOUS WEEKLY
Qty: 2 ML | Refills: 0 | Status: SHIPPED | OUTPATIENT
Start: 2023-06-05

## 2023-06-05 RX ORDER — ONDANSETRON HYDROCHLORIDE 8 MG/1
8 TABLET, FILM COATED ORAL EVERY 8 HOURS PRN
Qty: 15 TABLET | Refills: 1 | Status: SHIPPED | OUTPATIENT
Start: 2023-06-05

## 2023-06-05 NOTE — LETTER
June 5, 2023     Patient: Bing Poe   YOB: 1990   Date of Visit: 6/5/2023       To Whom It May Concern:    It is my medical opinion that Bing Poe may return to work on 6/13/2023.            Sincerely,        Ace Merino DO    CC: No Recipients

## 2023-06-05 NOTE — PROGRESS NOTES
"Chief Complaint  Weight Loss and left ankle instability    Subjective        Bing Poe presents to [facility]  History of Present Illness    Presents to follow-up on weight loss been trying mounjaro lots of nausea with it but lost 10 lb    Objective   Vital Signs:  /97   Pulse 67   Temp 98.6 °F (37 °C)   Resp 14   Ht 162.6 cm (64\")   Wt 99.8 kg (220 lb 1.6 oz)   SpO2 99%   BMI 37.78 kg/m²   Estimated body mass index is 37.78 kg/m² as calculated from the following:    Height as of this encounter: 162.6 cm (64\").    Weight as of this encounter: 99.8 kg (220 lb 1.6 oz).             Physical Exam  Vitals reviewed.   Constitutional:       Appearance: Normal appearance. She is well-developed.   HENT:      Head: Normocephalic and atraumatic.      Right Ear: External ear normal.      Left Ear: External ear normal.      Nose: Nose normal.   Eyes:      Conjunctiva/sclera: Conjunctivae normal.      Pupils: Pupils are equal, round, and reactive to light.   Cardiovascular:      Rate and Rhythm: Normal rate.   Pulmonary:      Effort: Pulmonary effort is normal.      Breath sounds: Normal breath sounds.   Abdominal:      General: There is no distension.   Skin:     General: Skin is warm and dry.   Neurological:      Mental Status: She is alert and oriented to person, place, and time. Mental status is at baseline.   Psychiatric:         Mood and Affect: Mood and affect normal.         Behavior: Behavior normal.         Thought Content: Thought content normal.         Judgment: Judgment normal.      Result Review :  The following data was reviewed by: Ace Merino DO on 06/05/2023:  Common labs          11/1/2022    15:18 11/6/2022    15:18 4/26/2023    11:23 4/26/2023    11:30   Common Labs   Glucose 79  109   83    BUN 9  12   10    Creatinine 0.71  0.78   0.80    Sodium 138  140   140    Potassium 3.8  3.6   4.2    Chloride 103  102   105    Calcium 8.7  8.8   9.0 "    Albumin 3.80  4.40   3.8    Total Bilirubin 0.3  0.4   0.4    Alkaline Phosphatase 83  101   79    AST (SGOT) 17  16   19    ALT (SGPT) 10  15   19    WBC 9.42  12.25   8.08    Hemoglobin 13.6  14.4   13.9    Hematocrit 41.5  43.3   41.8    Platelets 413  420   393    Total Cholesterol 151    130    Triglycerides 159    108    HDL Cholesterol 39    31    LDL Cholesterol  84    79    Hemoglobin A1C    5.10    Microalbumin, Urine   <1.2                    Assessment and Plan   Diagnoses and all orders for this visit:    1. Class 2 obesity due to excess calories without serious comorbidity with body mass index (BMI) of 38.0 to 38.9 in adult  -     Tirzepatide (Mounjaro) 2.5 MG/0.5ML solution pen-injector; Inject 0.5 mL under the skin into the appropriate area as directed 1 (One) Time Per Week.  Dispense: 2 mL; Refill: 0    Other orders  -     ondansetron (Zofran) 8 MG tablet; Take 1 tablet by mouth Every 8 (Eight) Hours As Needed for Nausea or Vomiting.  Dispense: 15 tablet; Refill: 1      Continue with medicine for weight loss follow-up in 1 to 2 months sooner if indicated continue to manage diet         Follow Up   No follow-ups on file.  Patient was given instructions and counseling regarding her condition or for health maintenance advice. Please see specific information pulled into the AVS if appropriate.

## 2023-06-05 NOTE — LETTER
June 5, 2023     Patient: Bing Poe   YOB: 1990   Date of Visit: 6/5/2023       To Whom It May Concern:    It is my medical opinion that Bing Poe may return to work on 6/6/2023.          Sincerely,        Ace Merino DO    CC: No Recipients

## 2023-09-11 PROCEDURE — 87660 TRICHOMONAS VAGIN DIR PROBE: CPT | Performed by: NURSE PRACTITIONER

## 2023-09-11 PROCEDURE — 87510 GARDNER VAG DNA DIR PROBE: CPT | Performed by: NURSE PRACTITIONER

## 2023-09-11 PROCEDURE — 87086 URINE CULTURE/COLONY COUNT: CPT | Performed by: NURSE PRACTITIONER

## 2023-09-11 PROCEDURE — 87480 CANDIDA DNA DIR PROBE: CPT | Performed by: NURSE PRACTITIONER

## 2023-09-12 DIAGNOSIS — B96.89 BACTERIAL VAGINOSIS: Primary | ICD-10-CM

## 2023-09-12 DIAGNOSIS — N76.0 BACTERIAL VAGINOSIS: Primary | ICD-10-CM

## 2023-09-12 RX ORDER — CLINDAMYCIN PHOSPHATE 20 MG/G
1 CREAM VAGINAL NIGHTLY
Qty: 7 G | Refills: 0 | Status: SHIPPED | OUTPATIENT
Start: 2023-09-12 | End: 2023-09-19

## 2023-10-27 ENCOUNTER — OFFICE VISIT (OUTPATIENT)
Dept: FAMILY MEDICINE CLINIC | Facility: CLINIC | Age: 33
End: 2023-10-27
Payer: COMMERCIAL

## 2023-10-27 ENCOUNTER — LAB (OUTPATIENT)
Dept: LAB | Facility: HOSPITAL | Age: 33
End: 2023-10-27
Payer: COMMERCIAL

## 2023-10-27 VITALS
HEART RATE: 74 BPM | BODY MASS INDEX: 40.25 KG/M2 | WEIGHT: 241.6 LBS | HEIGHT: 65 IN | TEMPERATURE: 98 F | SYSTOLIC BLOOD PRESSURE: 123 MMHG | RESPIRATION RATE: 18 BRPM | OXYGEN SATURATION: 99 % | DIASTOLIC BLOOD PRESSURE: 65 MMHG

## 2023-10-27 DIAGNOSIS — E11.9 TYPE 2 DIABETES MELLITUS WITHOUT COMPLICATION, WITHOUT LONG-TERM CURRENT USE OF INSULIN: Chronic | ICD-10-CM

## 2023-10-27 DIAGNOSIS — Z13.1 SCREENING FOR DIABETES MELLITUS: Primary | ICD-10-CM

## 2023-10-27 DIAGNOSIS — E66.09 CLASS 2 OBESITY DUE TO EXCESS CALORIES WITHOUT SERIOUS COMORBIDITY WITH BODY MASS INDEX (BMI) OF 38.0 TO 38.9 IN ADULT: Chronic | ICD-10-CM

## 2023-10-27 DIAGNOSIS — R53.83 FATIGUE, UNSPECIFIED TYPE: ICD-10-CM

## 2023-10-27 DIAGNOSIS — R42 DIZZINESS: ICD-10-CM

## 2023-10-27 DIAGNOSIS — Z13.29 THYROID DISORDER SCREENING: ICD-10-CM

## 2023-10-27 DIAGNOSIS — N39.45 CONTINUOUS URINE LEAKAGE: ICD-10-CM

## 2023-10-27 DIAGNOSIS — Z13.1 SCREENING FOR DIABETES MELLITUS: ICD-10-CM

## 2023-10-27 LAB
ALBUMIN SERPL-MCNC: 3.8 G/DL (ref 3.5–5.2)
ALBUMIN UR-MCNC: <1.2 MG/DL
ALBUMIN/GLOB SERPL: 1.2 G/DL
ALP SERPL-CCNC: 82 U/L (ref 39–117)
ALT SERPL W P-5'-P-CCNC: 16 U/L (ref 1–33)
ANION GAP SERPL CALCULATED.3IONS-SCNC: 7 MMOL/L (ref 5–15)
AST SERPL-CCNC: 17 U/L (ref 1–32)
BASOPHILS # BLD AUTO: 0.06 10*3/MM3 (ref 0–0.2)
BASOPHILS NFR BLD AUTO: 0.6 % (ref 0–1.5)
BILIRUB SERPL-MCNC: 0.3 MG/DL (ref 0–1.2)
BILIRUB UR QL STRIP: NEGATIVE
BUN SERPL-MCNC: 10 MG/DL (ref 6–20)
BUN/CREAT SERPL: 12.3 (ref 7–25)
CALCIUM SPEC-SCNC: 8.8 MG/DL (ref 8.6–10.5)
CHLORIDE SERPL-SCNC: 102 MMOL/L (ref 98–107)
CLARITY UR: CLEAR
CO2 SERPL-SCNC: 27 MMOL/L (ref 22–29)
COLOR UR: YELLOW
CREAT SERPL-MCNC: 0.81 MG/DL (ref 0.57–1)
CREAT UR-MCNC: 137.5 MG/DL
DEPRECATED RDW RBC AUTO: 38.7 FL (ref 37–54)
EGFRCR SERPLBLD CKD-EPI 2021: 98.4 ML/MIN/1.73
EOSINOPHIL # BLD AUTO: 0.22 10*3/MM3 (ref 0–0.4)
EOSINOPHIL NFR BLD AUTO: 2.3 % (ref 0.3–6.2)
ERYTHROCYTE [DISTWIDTH] IN BLOOD BY AUTOMATED COUNT: 12.6 % (ref 12.3–15.4)
FOLATE SERPL-MCNC: 9.12 NG/ML (ref 4.78–24.2)
GLOBULIN UR ELPH-MCNC: 3.3 GM/DL
GLUCOSE SERPL-MCNC: 88 MG/DL (ref 65–99)
GLUCOSE UR STRIP-MCNC: NEGATIVE MG/DL
HBA1C MFR BLD: 5.2 % (ref 4.8–5.6)
HCT VFR BLD AUTO: 40.9 % (ref 34–46.6)
HGB BLD-MCNC: 13.9 G/DL (ref 12–15.9)
HGB UR QL STRIP.AUTO: NEGATIVE
IMM GRANULOCYTES # BLD AUTO: 0.04 10*3/MM3 (ref 0–0.05)
IMM GRANULOCYTES NFR BLD AUTO: 0.4 % (ref 0–0.5)
IRON 24H UR-MRATE: 37 MCG/DL (ref 37–145)
IRON SATN MFR SERPL: 9 % (ref 20–50)
KETONES UR QL STRIP: NEGATIVE
LEUKOCYTE ESTERASE UR QL STRIP.AUTO: NEGATIVE
LYMPHOCYTES # BLD AUTO: 3.27 10*3/MM3 (ref 0.7–3.1)
LYMPHOCYTES NFR BLD AUTO: 33.6 % (ref 19.6–45.3)
MCH RBC QN AUTO: 29 PG (ref 26.6–33)
MCHC RBC AUTO-ENTMCNC: 34 G/DL (ref 31.5–35.7)
MCV RBC AUTO: 85.2 FL (ref 79–97)
MICROALBUMIN/CREAT UR: NORMAL MG/G{CREAT}
MONOCYTES # BLD AUTO: 0.53 10*3/MM3 (ref 0.1–0.9)
MONOCYTES NFR BLD AUTO: 5.5 % (ref 5–12)
NEUTROPHILS NFR BLD AUTO: 5.6 10*3/MM3 (ref 1.7–7)
NEUTROPHILS NFR BLD AUTO: 57.6 % (ref 42.7–76)
NITRITE UR QL STRIP: NEGATIVE
NRBC BLD AUTO-RTO: 0 /100 WBC (ref 0–0.2)
PH UR STRIP.AUTO: 6.5 [PH] (ref 5–8)
PLATELET # BLD AUTO: 401 10*3/MM3 (ref 140–450)
PMV BLD AUTO: 9.7 FL (ref 6–12)
POTASSIUM SERPL-SCNC: 4 MMOL/L (ref 3.5–5.2)
PROT SERPL-MCNC: 7.1 G/DL (ref 6–8.5)
PROT UR QL STRIP: NEGATIVE
RBC # BLD AUTO: 4.8 10*6/MM3 (ref 3.77–5.28)
SODIUM SERPL-SCNC: 136 MMOL/L (ref 136–145)
SP GR UR STRIP: 1.03 (ref 1–1.03)
T4 FREE SERPL-MCNC: 1.2 NG/DL (ref 0.93–1.7)
TIBC SERPL-MCNC: 392 MCG/DL (ref 298–536)
TRANSFERRIN SERPL-MCNC: 263 MG/DL (ref 200–360)
TSH SERPL DL<=0.05 MIU/L-ACNC: 1.27 UIU/ML (ref 0.27–4.2)
UROBILINOGEN UR QL STRIP: NORMAL
VIT B12 BLD-MCNC: 694 PG/ML (ref 211–946)
WBC NRBC COR # BLD: 9.72 10*3/MM3 (ref 3.4–10.8)

## 2023-10-27 PROCEDURE — 82607 VITAMIN B-12: CPT

## 2023-10-27 PROCEDURE — 81001 URINALYSIS AUTO W/SCOPE: CPT

## 2023-10-27 PROCEDURE — 84443 ASSAY THYROID STIM HORMONE: CPT

## 2023-10-27 PROCEDURE — 82570 ASSAY OF URINE CREATININE: CPT

## 2023-10-27 PROCEDURE — 83540 ASSAY OF IRON: CPT

## 2023-10-27 PROCEDURE — 84439 ASSAY OF FREE THYROXINE: CPT

## 2023-10-27 PROCEDURE — 82746 ASSAY OF FOLIC ACID SERUM: CPT

## 2023-10-27 PROCEDURE — 85025 COMPLETE CBC W/AUTO DIFF WBC: CPT

## 2023-10-27 PROCEDURE — 83036 HEMOGLOBIN GLYCOSYLATED A1C: CPT

## 2023-10-27 PROCEDURE — 80053 COMPREHEN METABOLIC PANEL: CPT

## 2023-10-27 PROCEDURE — 82043 UR ALBUMIN QUANTITATIVE: CPT

## 2023-10-27 PROCEDURE — 84466 ASSAY OF TRANSFERRIN: CPT

## 2023-10-27 PROCEDURE — 36415 COLL VENOUS BLD VENIPUNCTURE: CPT

## 2023-10-28 LAB
BACTERIA UR QL AUTO: ABNORMAL /HPF
HYALINE CASTS UR QL AUTO: ABNORMAL /LPF
RBC # UR STRIP: ABNORMAL /HPF
REF LAB TEST METHOD: ABNORMAL
SQUAMOUS #/AREA URNS HPF: ABNORMAL /HPF
WBC # UR STRIP: ABNORMAL /HPF

## 2023-10-30 ENCOUNTER — TELEPHONE (OUTPATIENT)
Dept: FAMILY MEDICINE CLINIC | Facility: CLINIC | Age: 33
End: 2023-10-30
Payer: COMMERCIAL

## 2023-10-30 NOTE — ASSESSMENT & PLAN NOTE
Patient's (Body mass index is 40.2 kg/m².) indicates that they are obese (BMI >30) with health conditions that include diabetes mellitus . Weight is unchanged. BMI  is above average; BMI management plan is completed. We discussed portion control and increasing exercise.

## 2023-10-30 NOTE — PROGRESS NOTES
Nothing abnormal on urine cutlure. Iron is low. Need to take iron supplementation. I can send that in or she can get OTC. Let me know which she prefers

## 2023-10-30 NOTE — PROGRESS NOTES
Chief Complaint   Patient presents with    Dizziness     X3 weeks    Fatigue     After meals       Subjective          Bing Poe presents to [facility]    History of Present Illness  Bing is here to be seen for dizziness and fatigue. She has been having the dizziness for 3 weeks now. The fatigue is mainly after meals but has been ongoing for 2 weeks or so. She has 5 children at home. She is also having continuous urine leakage that she states has gotten worse. Will do a urine to make sure she doesn't have a UTI. If no UTI present we discussed her seeing urology for the urine leakage.       Past History:  Medical History: has a past medical history of ADHD (11/22/2021), Asthma, History of pulmonary embolism, Lupus anticoagulant positive, Migraines, and Urinary incontinence (03/18/2021).   Surgical History: has no past surgical history on file.   Family History: family history includes Diabetes in her maternal aunt and mother; Heart disease in an other family member.   Social History: reports that she has never smoked. She has never been exposed to tobacco smoke. She has never used smokeless tobacco. She reports current alcohol use. She reports that she does not use drugs.  Allergies: Sulfamethoxazole-trimethoprim and Heparin  (Not in a hospital admission)       Social History     Socioeconomic History    Marital status:      Spouse name: Mica     Number of children: 4    Years of education: 12    Highest education level: High school graduate   Tobacco Use    Smoking status: Never     Passive exposure: Never    Smokeless tobacco: Never   Vaping Use    Vaping Use: Never used   Substance and Sexual Activity    Alcohol use: Yes     Comment: occasional    Drug use: Never    Sexual activity: Yes     Partners: Male       Health Maintenance Due   Topic Date Due    COVID-19 Vaccine (1) Never done       Objective     Vital Signs:   /65 (BP Location: Left  "arm, Patient Position: Sitting, Cuff Size: Adult)   Pulse 74   Temp 98 °F (36.7 °C) (Temporal)   Resp 18   Ht 165.1 cm (65\")   Wt 110 kg (241 lb 9.6 oz)   SpO2 99%   BMI 40.20 kg/m²       Physical Exam  Constitutional:       Appearance: Normal appearance.   HENT:      Nose: Nose normal.      Mouth/Throat:      Mouth: Mucous membranes are moist.   Cardiovascular:      Rate and Rhythm: Normal rate and regular rhythm.      Pulses: Normal pulses.      Heart sounds: Normal heart sounds.   Pulmonary:      Effort: Pulmonary effort is normal.      Breath sounds: Normal breath sounds.   Skin:     General: Skin is warm and dry.   Neurological:      General: No focal deficit present.      Mental Status: She is alert and oriented to person, place, and time.   Psychiatric:         Mood and Affect: Mood normal.         Behavior: Behavior normal.          Review of Systems   Constitutional:  Positive for fatigue.   Neurological:  Positive for dizziness.        Result Review :                 Assessment and Plan    Diagnoses and all orders for this visit:    1. Screening for diabetes mellitus (Primary)  -     Hemoglobin A1c; Future    2. Dizziness  -     CBC w AUTO Differential; Future  -     Iron and TIBC; Future  -     Comprehensive metabolic panel; Future    3. Fatigue, unspecified type  -     CBC w AUTO Differential; Future  -     Iron and TIBC; Future  -     Vitamin B12; Future  -     Folate; Future  -     Comprehensive metabolic panel; Future    4. Thyroid disorder screening  -     TSH+Free T4; Future    5. Continuous urine leakage  -     Urinalysis With Microscopic - Urine, Clean Catch; Future  -     Ambulatory Referral to Urology    6. Class 2 obesity due to excess calories without serious comorbidity with body mass index (BMI) of 38.0 to 38.9 in adult  Assessment & Plan:  Patient's (Body mass index is 40.2 kg/m².) indicates that they are obese (BMI >30) with health conditions that include diabetes mellitus . Weight " is unchanged. BMI  is above average; BMI management plan is completed. We discussed portion control and increasing exercise.       7. Type 2 diabetes mellitus without complication, without long-term current use of insulin  -     Microalbumin / Creatinine Urine Ratio - Urine, Clean Catch; Future          Pt thought to be clinically stable at this time.    Follow Up   Return in about 3 months (around 1/27/2024), or if symptoms worsen or fail to improve.  Patient was given instructions and counseling regarding her condition or for health maintenance advice. Please see specific information pulled into the AVS if appropriate.

## 2023-11-01 DIAGNOSIS — E61.1 IRON DEFICIENCY: Primary | ICD-10-CM

## 2023-11-01 RX ORDER — FERROUS GLUCONATE 324(38)MG
324 TABLET ORAL
Qty: 30 TABLET | Refills: 3 | Status: SHIPPED | OUTPATIENT
Start: 2023-11-01

## 2023-11-02 NOTE — ASSESSMENT & PLAN NOTE
Continue prenatal vitamins  Kick counts  Labor instructions  GBS negative  Patient has not received flu or Tdap vaccines.  Discussed the importance of these vaccines.  Urged to get ASAP.  Vaccine prescriptions given today.   Parents

## 2023-12-15 ENCOUNTER — OFFICE VISIT (OUTPATIENT)
Dept: UROLOGY | Facility: CLINIC | Age: 33
End: 2023-12-15
Payer: COMMERCIAL

## 2023-12-15 VITALS
DIASTOLIC BLOOD PRESSURE: 78 MMHG | WEIGHT: 239.2 LBS | HEIGHT: 65 IN | HEART RATE: 69 BPM | SYSTOLIC BLOOD PRESSURE: 118 MMHG | BODY MASS INDEX: 39.85 KG/M2

## 2023-12-15 DIAGNOSIS — N39.41 URGE INCONTINENCE OF URINE: ICD-10-CM

## 2023-12-15 DIAGNOSIS — N39.45 CONTINUOUS LEAKAGE OF URINE: Primary | ICD-10-CM

## 2023-12-15 DIAGNOSIS — N39.3 STRESS INCONTINENCE: ICD-10-CM

## 2023-12-15 DIAGNOSIS — R39.15 URINARY URGENCY: ICD-10-CM

## 2023-12-15 LAB
BILIRUB BLD-MCNC: NEGATIVE MG/DL
CLARITY, POC: CLEAR
COLOR UR: YELLOW
EXPIRATION DATE: NORMAL
GLUCOSE UR STRIP-MCNC: NEGATIVE MG/DL
KETONES UR QL: NEGATIVE
LEUKOCYTE EST, POC: NEGATIVE
Lab: NORMAL
NITRITE UR-MCNC: NEGATIVE MG/ML
PH UR: 6 [PH] (ref 5–8)
PROT UR STRIP-MCNC: NEGATIVE MG/DL
RBC # UR STRIP: NEGATIVE /UL
SP GR UR: 1.02 (ref 1–1.03)
UROBILINOGEN UR QL: NORMAL

## 2023-12-15 RX ORDER — NAPROXEN SODIUM 550 MG/1
TABLET ORAL
COMMUNITY
End: 2023-12-15

## 2023-12-15 RX ORDER — OXYBUTYNIN CHLORIDE 5 MG/1
5 TABLET, EXTENDED RELEASE ORAL DAILY
Qty: 90 TABLET | Refills: 3 | Status: SHIPPED | OUTPATIENT
Start: 2023-12-15

## 2023-12-15 RX ORDER — BACLOFEN 10 MG/1
1 TABLET ORAL 3 TIMES DAILY
COMMUNITY

## 2023-12-15 NOTE — PROGRESS NOTES
Chief Complaint: Urinary Incontinence (Pt is here for urinary incontinence that started a couple of years ago but states that it has gotten worse just within the past month. Pt states that she will have episodes of incontinence randomly when she feels like she doesn't need to void. Pt states she has not noticed anything that makes it worse. )    Subjective         History of Present Illness  Bing Poe is a 33 y.o. female presents to [facility] to be seen for urinary incontiencne.    I last saw the patient back in 2021 recommended PFPT.    She reports that she is having to wear a pad daily.     She is wearing a single pad a day.     She states around her period she has more urgency and frequency.    She states that at times she feels like she feels like a utis with the frequency she has. She states she voids every hour.    She does leak with cough laugh and sneeze.    Nocturia x 2     She drinks water, coffee (reports 1.5 cups) and soda x2 daily.    She does have issues with recurrent BV.    No hx of renal stones.     No family hx of  malignancies.            Objective     Past Medical History:   Diagnosis Date    ADHD 11/22/2021    Asthma     History of pulmonary embolism     on OCPs    Lupus anticoagulant positive     Migraines     Urinary incontinence 03/18/2021       History reviewed. No pertinent surgical history.      Current Outpatient Medications:     baclofen (LIORESAL) 10 MG tablet, Take 1 tablet by mouth 3 (Three) Times a Day., Disp: , Rfl:     ferrous gluconate (FERGON) 324 MG tablet, Take 1 tablet by mouth Daily With Breakfast., Disp: 30 tablet, Rfl: 3    fluticasone (FLONASE) 50 MCG/ACT nasal spray, 2 sprays into the nostril(s) as directed by provider Daily., Disp: 18.2 mL, Rfl: 0    Insulin Pen Needle 31G X 6 MM misc, 1 each 1 (One) Time Per Week., Disp: 50 each, Rfl: 0    ondansetron ODT (ZOFRAN-ODT) 4 MG disintegrating tablet, Place 1 tablet on the  tongue Every 8 (Eight) Hours As Needed for Nausea or Vomiting., Disp: 6 tablet, Rfl: 0    Tirzepatide (Mounjaro) 2.5 MG/0.5ML solution pen-injector, Inject 0.5 mL under the skin into the appropriate area as directed 1 (One) Time Per Week., Disp: 2 mL, Rfl: 0    oxybutynin XL (DITROPAN-XL) 5 MG 24 hr tablet, Take 1 tablet by mouth Daily., Disp: 90 tablet, Rfl: 3    Allergies   Allergen Reactions    Sulfamethoxazole-Trimethoprim Anaphylaxis and Swelling    Heparin Rash     Patient was told to take benadryl with Heparin. Patient states she had a rash and was told to take benedryl to help with Rash and itchy throat         Family History   Problem Relation Age of Onset    Diabetes Mother     Diabetes Maternal Aunt     Heart disease Other        Social History     Socioeconomic History    Marital status:      Spouse name: Mica     Number of children: 4    Years of education: 12    Highest education level: High school graduate   Tobacco Use    Smoking status: Never     Passive exposure: Never    Smokeless tobacco: Never   Vaping Use    Vaping Use: Never used   Substance and Sexual Activity    Alcohol use: Yes     Comment: occasional    Drug use: Never    Sexual activity: Yes     Partners: Male       Vital Signs:   /78 (BP Location: Left arm, Patient Position: Sitting, Cuff Size: Large Adult)   Pulse 69      Physical Exam     Result Review :   The following data was reviewed by: OBI Pruitt on 12/15/2023:  Results for orders placed or performed in visit on 12/15/23   POC Urinalysis Dipstick, Automated    Specimen: Urine   Result Value Ref Range    Color Yellow Yellow, Straw, Dark Yellow, Batool    Clarity, UA Clear Clear    Specific Gravity  1.025 1.005 - 1.030    pH, Urine 6.0 5.0 - 8.0    Leukocytes Negative Negative    Nitrite, UA Negative Negative    Protein, POC Negative Negative mg/dL    Glucose, UA Negative Negative mg/dL    Ketones, UA Negative Negative    Urobilinogen, UA 0.2 E.U./dL  "Normal, 0.2 E.U./dL    Bilirubin Negative Negative    Blood, UA Negative Negative    Lot Number [id number]     Expiration Date 2,024/10             Procedures        Assessment and Plan    Diagnoses and all orders for this visit:    1. Continuous leakage of urine (Primary)  -     POC Urinalysis Dipstick, Automated    2. Urinary urgency  -     oxybutynin XL (DITROPAN-XL) 5 MG 24 hr tablet; Take 1 tablet by mouth Daily.  Dispense: 90 tablet; Refill: 3    3. Stress incontinence    4. Urge incontinence of urine      Mixed Urinary incontinence-discussed with patient at length, all questions addressed. Discussed with patient that mixed urinary incontinence is a common condition that is multifactorial in nature and frequently difficult to treat, unlikely to cure, and management is dictated by patient motivation to improve and cope with symptoms.                 Urge component- most bothersome to patient; will start behavioral modifications via timed and double voiding, fluid management, \"quick flicks\" with onset of urgency, not drinking right before bedtime, voiding prior to going to sleep, .  Patient also interested in trial of medication for overactive bladder.  Will start with trial of anticholinergic oxybutynin ER prescribed.  Side effects of this class discussed with patient including but not limited to dry mouth and constipation.               Stress component-   - Initiate conservative management.  Discussed that we could potentially get her set up for  urethral bulking agent if Continue bothersome stress incontinence after tx oab             I spent 15 minutes caring for Bing on this date of service. This time includes time spent by me in the following activities:reviewing tests, obtaining and/or reviewing a separately obtained history, performing a medically appropriate examination and/or evaluation , counseling and educating the patient/family/caregiver, ordering medications, tests, or procedures, and documenting " information in the medical record  Follow Up   Return in about 8 weeks (around 2/9/2024) for f/u OAB.  Patient was given instructions and counseling regarding her condition or for health maintenance advice. Please see specific information pulled into the AVS if appropriate.         This document has been electronically signed by OBI Pruitt  December 15, 2023 10:22 EST

## 2024-01-05 ENCOUNTER — TELEPHONE (OUTPATIENT)
Dept: FAMILY MEDICINE CLINIC | Facility: CLINIC | Age: 34
End: 2024-01-05
Payer: COMMERCIAL

## 2024-01-05 NOTE — TELEPHONE ENCOUNTER
Caller: Bing Morton    Relationship: Self    Best call back number: [phone]    What form or medical record are you requesting: DR JANKI WELDON WORK TODAY    Who is requesting this form or medical record from you: PATIENT    How would you like to receive the form or medical records (pick-up, mail, fax): MYCHART  If fax, what is the fax number: N/A  If mail, what is the address: N/A  If pick-up, provide patient with address and location details    Timeframe paperwork needed: ASAP    Additional notes: PATIENT WOULD LIKE TO HAVE A DR NOTE FOR TODAY. SHE WAS SEEN IN THE ER YESTERDAY FOR BACK PAIN. SHE WAS GIVEN MUSCLE RELAXER AND SHOT FOR PAIN. NOW SHE IS HAVING PAIN AND SHE CANNOT DRIVE TO COME IN FOR A VISIT. PLEASE CALL PATIENT BACK AND ADVISE. PLEASE PUT DR NOTE FOR TODAY IN HER MYCHART SO SHE CAN SEND TO HER EMPLOYER.

## 2024-01-08 NOTE — TELEPHONE ENCOUNTER
Called patient left voicemail to call office back,patient needs an appointment if she wants a work excuse otherwise would need to call the ER to discus with them.

## 2024-01-29 ENCOUNTER — HOSPITAL ENCOUNTER (OUTPATIENT)
Dept: GENERAL RADIOLOGY | Facility: HOSPITAL | Age: 34
Discharge: HOME | End: 2024-01-29
Payer: COMMERCIAL

## 2024-01-29 ENCOUNTER — OFFICE VISIT (OUTPATIENT)
Dept: FAMILY MEDICINE CLINIC | Facility: CLINIC | Age: 34
End: 2024-01-29
Payer: COMMERCIAL

## 2024-01-29 VITALS
SYSTOLIC BLOOD PRESSURE: 104 MMHG | DIASTOLIC BLOOD PRESSURE: 60 MMHG | RESPIRATION RATE: 16 BRPM | BODY MASS INDEX: 39.99 KG/M2 | OXYGEN SATURATION: 100 % | TEMPERATURE: 98 F | HEART RATE: 84 BPM | HEIGHT: 65 IN | WEIGHT: 240 LBS

## 2024-01-29 DIAGNOSIS — M54.42 CHRONIC MIDLINE LOW BACK PAIN WITH LEFT-SIDED SCIATICA: ICD-10-CM

## 2024-01-29 DIAGNOSIS — R82.90 ABNORMAL URINE ODOR: Primary | ICD-10-CM

## 2024-01-29 DIAGNOSIS — G89.29 CHRONIC MIDLINE LOW BACK PAIN WITH LEFT-SIDED SCIATICA: ICD-10-CM

## 2024-01-29 DIAGNOSIS — R30.0 DYSURIA: ICD-10-CM

## 2024-01-29 DIAGNOSIS — R82.90 ABNORMAL URINE ODOR: ICD-10-CM

## 2024-01-29 DIAGNOSIS — N92.6 MENSTRUAL CYCLE PROBLEM: ICD-10-CM

## 2024-01-29 DIAGNOSIS — N89.8 FOUL SMELLING VAGINAL DISCHARGE: ICD-10-CM

## 2024-01-29 LAB
B-HCG UR QL: NEGATIVE
BILIRUB BLD-MCNC: NEGATIVE MG/DL
CANDIDA SPECIES: NEGATIVE
CLARITY, POC: CLEAR
COLOR UR: YELLOW
EXPIRATION DATE: ABNORMAL
EXPIRATION DATE: NORMAL
GARDNERELLA VAGINALIS: POSITIVE
GLUCOSE UR STRIP-MCNC: NEGATIVE MG/DL
INTERNAL NEGATIVE CONTROL: NORMAL
INTERNAL POSITIVE CONTROL: NORMAL
KETONES UR QL: NEGATIVE
LEUKOCYTE EST, POC: ABNORMAL
Lab: ABNORMAL
Lab: NORMAL
NITRITE UR-MCNC: NEGATIVE MG/ML
PH UR: 6 [PH] (ref 5–8)
PROT UR STRIP-MCNC: ABNORMAL MG/DL
RBC # UR STRIP: ABNORMAL /UL
SP GR UR: 1.03 (ref 1–1.03)
T VAGINALIS DNA VAG QL PROBE+SIG AMP: NEGATIVE
UROBILINOGEN UR QL: NORMAL

## 2024-01-29 PROCEDURE — 72110 X-RAY EXAM L-2 SPINE 4/>VWS: CPT

## 2024-01-29 PROCEDURE — 81025 URINE PREGNANCY TEST: CPT

## 2024-01-29 PROCEDURE — 99214 OFFICE O/P EST MOD 30 MIN: CPT

## 2024-01-29 PROCEDURE — 87480 CANDIDA DNA DIR PROBE: CPT

## 2024-01-29 PROCEDURE — 87660 TRICHOMONAS VAGIN DIR PROBE: CPT

## 2024-01-29 PROCEDURE — 87510 GARDNER VAG DNA DIR PROBE: CPT

## 2024-01-29 PROCEDURE — 87086 URINE CULTURE/COLONY COUNT: CPT

## 2024-01-29 NOTE — PROGRESS NOTES
"Chief Complaint  Vaginal Discharge (White in color, symptoms started a week ago ), burning while urinating , urine odor, and Menstrual Problem (Pt states here lately she has been skipping periods or having them to close together.)    Subjective        Bing Poe presents to [facility]  History of Present Illness  She is here today to be seen for abnormal vaginal odor and dysuria. Her urine showed large amount of leukocytes today. Will send this off for culture. She has had BV multiple times before and we will swab her today for this as well. She's been having back pain for years but now that she is on her feet more at her job she is having worsening pain with shooting pain down her left leg.       Objective   Vital Signs:  /60   Pulse 84   Temp 98 °F (36.7 °C) (Temporal)   Resp 16   Ht 165.1 cm (65\")   Wt 109 kg (240 lb)   SpO2 100%   BMI 39.94 kg/m²   Estimated body mass index is 39.94 kg/m² as calculated from the following:    Height as of this encounter: 165.1 cm (65\").    Weight as of this encounter: 109 kg (240 lb).               Physical Exam  Constitutional:       Appearance: Normal appearance.   HENT:      Nose: Nose normal.      Mouth/Throat:      Mouth: Mucous membranes are moist.   Cardiovascular:      Rate and Rhythm: Normal rate and regular rhythm.      Pulses: Normal pulses.      Heart sounds: Normal heart sounds.   Pulmonary:      Effort: Pulmonary effort is normal.      Breath sounds: Normal breath sounds.   Skin:     General: Skin is warm and dry.   Neurological:      General: No focal deficit present.      Mental Status: She is alert and oriented to person, place, and time.   Psychiatric:         Mood and Affect: Mood normal.         Behavior: Behavior normal.        Result Review :                     Assessment and Plan     Diagnoses and all orders for this visit:    1. Abnormal urine odor (Primary)  -     POCT urinalysis dipstick, " automated  -     Gardnerella vaginalis, Trichomonas vaginalis, Candida albicans, DNA - Swab, Vagina  -     Urine Culture - Urine, Urine, Clean Catch; Future    2. Dysuria  -     POCT urinalysis dipstick, automated  -     Urine Culture - Urine, Urine, Clean Catch; Future    3. Menstrual cycle problem  -     POCT pregnancy, urine    4. Foul smelling vaginal discharge  -     Gardnerella vaginalis, Trichomonas vaginalis, Candida albicans, DNA - Swab, Vagina    5. Chronic midline low back pain with left-sided sciatica  -     XR Spine Lumbar 4+ View; Future             Follow Up     Return if symptoms worsen or fail to improve.  Patient was given instructions and counseling regarding her condition or for health maintenance advice. Please see specific information pulled into the AVS if appropriate.

## 2024-01-30 ENCOUNTER — TELEPHONE (OUTPATIENT)
Dept: FAMILY MEDICINE CLINIC | Facility: CLINIC | Age: 34
End: 2024-01-30
Payer: COMMERCIAL

## 2024-01-30 ENCOUNTER — PATIENT ROUNDING (BHMG ONLY) (OUTPATIENT)
Dept: FAMILY MEDICINE CLINIC | Facility: CLINIC | Age: 34
End: 2024-01-30
Payer: COMMERCIAL

## 2024-01-30 LAB — BACTERIA SPEC AEROBE CULT: NORMAL

## 2024-01-30 NOTE — TELEPHONE ENCOUNTER
Caller: Bing Morton    Relationship: Self    Best call back number: [phone]     Caller requesting test results: PATIENT     What test was performed: XRAY    When was the test performed: YESTERDAY    Where was the test performed: IN OFFICE    Additional notes: PATIENT WOULD LIKE TO KNOW THE RESULTS OF HER XRAY. PLEASE CALL PATIENT TO ADVISE.

## 2024-01-30 NOTE — PROGRESS NOTES
A My-Chart message has been sent to the patient for PATIENT ROUNDING with [facility].

## 2024-01-31 DIAGNOSIS — B96.89 BACTERIAL VAGINOSIS: Primary | ICD-10-CM

## 2024-01-31 DIAGNOSIS — N76.0 BACTERIAL VAGINOSIS: Primary | ICD-10-CM

## 2024-01-31 RX ORDER — METRONIDAZOLE 500 MG/1
500 TABLET ORAL 3 TIMES DAILY
Qty: 21 TABLET | Refills: 0 | Status: SHIPPED | OUTPATIENT
Start: 2024-01-31 | End: 2024-02-07

## 2024-02-02 ENCOUNTER — TELEPHONE (OUTPATIENT)
Dept: FAMILY MEDICINE CLINIC | Facility: CLINIC | Age: 34
End: 2024-02-02
Payer: COMMERCIAL

## 2024-02-02 DIAGNOSIS — M54.30 BACK PAIN WITH SCIATICA: Primary | ICD-10-CM

## 2024-02-02 DIAGNOSIS — M54.9 BACK PAIN WITH SCIATICA: Primary | ICD-10-CM

## 2024-02-02 NOTE — TELEPHONE ENCOUNTER
Caller: Bing Morton    Relationship to patient: Self    Best call back number: [phone]    Patient is needing: PATIENT CALLED IN AND IS REQUESTING A CALL BACK REGARDING MEDICATION QUESTION ABOUT     diclofenac (VOLTAREN) 50 MG EC tablet       PATIENT WOULD ALSO LIKE TO KNOW IF SHE SHOULD WEAR A BACK BRACE AND WOULD LIKE TO CHECK ON STATUS OF PT REFERRAL. PATIENT SAID IT IS OKAY TO LEAVE MESSAGE ON PHONE

## 2024-02-12 RX ORDER — MELOXICAM 15 MG/1
15 TABLET ORAL DAILY
Qty: 30 TABLET | Refills: 0 | Status: SHIPPED | OUTPATIENT
Start: 2024-02-12

## 2024-02-13 ENCOUNTER — TELEPHONE (OUTPATIENT)
Dept: UROLOGY | Facility: CLINIC | Age: 34
End: 2024-02-13

## 2024-02-25 DIAGNOSIS — E66.09 CLASS 2 OBESITY DUE TO EXCESS CALORIES WITHOUT SERIOUS COMORBIDITY WITH BODY MASS INDEX (BMI) OF 38.0 TO 38.9 IN ADULT: Chronic | ICD-10-CM

## 2024-02-26 RX ORDER — TIRZEPATIDE 2.5 MG/.5ML
INJECTION, SOLUTION SUBCUTANEOUS
Qty: 2 ML | Refills: 1 | Status: SHIPPED | OUTPATIENT
Start: 2024-02-26

## 2024-02-27 PROCEDURE — 87086 URINE CULTURE/COLONY COUNT: CPT

## 2024-02-27 PROCEDURE — 87480 CANDIDA DNA DIR PROBE: CPT

## 2024-02-27 PROCEDURE — 87660 TRICHOMONAS VAGIN DIR PROBE: CPT

## 2024-02-27 PROCEDURE — 87510 GARDNER VAG DNA DIR PROBE: CPT

## 2024-02-29 ENCOUNTER — TELEPHONE (OUTPATIENT)
Dept: URGENT CARE | Facility: CLINIC | Age: 34
End: 2024-02-29
Payer: COMMERCIAL

## 2024-03-18 ENCOUNTER — TELEPHONE (OUTPATIENT)
Dept: FAMILY MEDICINE CLINIC | Facility: CLINIC | Age: 34
End: 2024-03-18
Payer: COMMERCIAL

## 2024-03-18 DIAGNOSIS — M54.42 CHRONIC MIDLINE LOW BACK PAIN WITH LEFT-SIDED SCIATICA: Primary | ICD-10-CM

## 2024-03-18 DIAGNOSIS — G89.29 CHRONIC MIDLINE LOW BACK PAIN WITH LEFT-SIDED SCIATICA: Primary | ICD-10-CM

## 2024-03-18 NOTE — TELEPHONE ENCOUNTER
Caller: Bing Morton    Relationship: Self    Best call back number: [phone]     Requested Prescriptions:      MUSCLE RELAXER    Pharmacy where request should be sent: [id number] - [place], [place] - [address] - [phone]  - [phone] FX     Last office visit with prescribing clinician: 6/5/2023   Last telemedicine visit with prescribing clinician: Visit date not found   Next office visit with prescribing clinician: Visit date not found       Does the patient have less than a 3 day supply:  [x] Yes  [] No      Cristobal Dominguez Rep   03/18/24 11:29 EDT

## 2024-03-18 NOTE — TELEPHONE ENCOUNTER
Caller: Bing Morton    Relationship: Self    Best call back number: [phone]     What was the call regarding: PATIENT STATES HER INSURANCE IS NEEDING US TO RESEND HER REFERRAL TO [facility] [place].

## 2024-03-19 PROBLEM — E11.9 TYPE 2 DIABETES MELLITUS WITHOUT COMPLICATION, WITHOUT LONG-TERM CURRENT USE OF INSULIN: Chronic | Status: RESOLVED | Noted: 2022-12-27 | Resolved: 2024-03-19

## 2024-03-19 RX ORDER — MELOXICAM 15 MG/1
15 TABLET ORAL DAILY
Qty: 30 TABLET | Refills: 0 | Status: SHIPPED | OUTPATIENT
Start: 2024-03-19

## 2024-03-19 RX ORDER — FLUCONAZOLE 150 MG/1
TABLET ORAL
COMMUNITY
Start: 2024-03-17

## 2024-03-19 NOTE — TELEPHONE ENCOUNTER
Patient needs new PT referral since old one is over 30 days old. Pended order, needs to go to [facility]    You can access the FollowMyHealth Patient Portal offered by [facility] by registering at the following website: [email or web address]. By joining [facility]’s FollowMyHealth portal, you will also be able to view your health information using other applications (apps) compatible with our system.

## 2024-04-11 ENCOUNTER — HOSPITAL ENCOUNTER (EMERGENCY)
Facility: HOSPITAL | Age: 34
Discharge: HOME | End: 2024-04-11
Attending: EMERGENCY MEDICINE
Payer: COMMERCIAL

## 2024-04-11 ENCOUNTER — TELEPHONE (OUTPATIENT)
Dept: FAMILY MEDICINE CLINIC | Facility: CLINIC | Age: 34
End: 2024-04-11
Payer: COMMERCIAL

## 2024-04-11 ENCOUNTER — APPOINTMENT (OUTPATIENT)
Dept: CT IMAGING | Facility: HOSPITAL | Age: 34
End: 2024-04-11
Payer: COMMERCIAL

## 2024-04-11 VITALS
WEIGHT: 241.18 LBS | DIASTOLIC BLOOD PRESSURE: 63 MMHG | OXYGEN SATURATION: 99 % | HEART RATE: 62 BPM | SYSTOLIC BLOOD PRESSURE: 119 MMHG | HEIGHT: 65 IN | TEMPERATURE: 98.7 F | RESPIRATION RATE: 16 BRPM | BODY MASS INDEX: 40.18 KG/M2

## 2024-04-11 DIAGNOSIS — R10.9 ABDOMINAL PAIN, UNSPECIFIED ABDOMINAL LOCATION: Primary | ICD-10-CM

## 2024-04-11 DIAGNOSIS — R11.2 NAUSEA AND VOMITING, UNSPECIFIED VOMITING TYPE: ICD-10-CM

## 2024-04-11 LAB
ALBUMIN SERPL-MCNC: 3.8 G/DL (ref 3.5–5.2)
ALBUMIN/GLOB SERPL: 1.1 G/DL
ALP SERPL-CCNC: 85 U/L (ref 39–117)
ALT SERPL W P-5'-P-CCNC: 22 U/L (ref 1–33)
ANION GAP SERPL CALCULATED.3IONS-SCNC: 10.3 MMOL/L (ref 5–15)
AST SERPL-CCNC: 20 U/L (ref 1–32)
BACTERIA UR QL AUTO: ABNORMAL /HPF
BACTERIA UR QL AUTO: NORMAL /HPF
BASOPHILS # BLD AUTO: 0.08 10*3/MM3 (ref 0–0.2)
BASOPHILS NFR BLD AUTO: 0.6 % (ref 0–1.5)
BILIRUB SERPL-MCNC: 0.4 MG/DL (ref 0–1.2)
BILIRUB UR QL STRIP: ABNORMAL
BILIRUB UR QL STRIP: NEGATIVE
BUN SERPL-MCNC: 11 MG/DL (ref 6–20)
BUN/CREAT SERPL: 12.5 (ref 7–25)
CALCIUM SPEC-SCNC: 8.6 MG/DL (ref 8.6–10.5)
CHLORIDE SERPL-SCNC: 101 MMOL/L (ref 98–107)
CLARITY UR: ABNORMAL
CLARITY UR: CLEAR
CO2 SERPL-SCNC: 28.7 MMOL/L (ref 22–29)
COLOR UR: ABNORMAL
COLOR UR: YELLOW
CREAT SERPL-MCNC: 0.88 MG/DL (ref 0.57–1)
DEPRECATED RDW RBC AUTO: 37.8 FL (ref 37–54)
EGFRCR SERPLBLD CKD-EPI 2021: 89.1 ML/MIN/1.73
EOSINOPHIL # BLD AUTO: 0.19 10*3/MM3 (ref 0–0.4)
EOSINOPHIL NFR BLD AUTO: 1.4 % (ref 0.3–6.2)
ERYTHROCYTE [DISTWIDTH] IN BLOOD BY AUTOMATED COUNT: 12.4 % (ref 12.3–15.4)
GLOBULIN UR ELPH-MCNC: 3.6 GM/DL
GLUCOSE SERPL-MCNC: 89 MG/DL (ref 65–99)
GLUCOSE UR STRIP-MCNC: NEGATIVE MG/DL
GLUCOSE UR STRIP-MCNC: NEGATIVE MG/DL
HCG INTACT+B SERPL-ACNC: <0.5 MIU/ML
HCT VFR BLD AUTO: 42.2 % (ref 34–46.6)
HGB BLD-MCNC: 14.1 G/DL (ref 12–15.9)
HGB UR QL STRIP.AUTO: ABNORMAL
HGB UR QL STRIP.AUTO: ABNORMAL
HOLD SPECIMEN: NORMAL
HOLD SPECIMEN: NORMAL
HYALINE CASTS UR QL AUTO: ABNORMAL /LPF
HYALINE CASTS UR QL AUTO: NORMAL /LPF
IMM GRANULOCYTES # BLD AUTO: 0.09 10*3/MM3 (ref 0–0.05)
IMM GRANULOCYTES NFR BLD AUTO: 0.7 % (ref 0–0.5)
KETONES UR QL STRIP: NEGATIVE
KETONES UR QL STRIP: NEGATIVE
LEUKOCYTE ESTERASE UR QL STRIP.AUTO: ABNORMAL
LEUKOCYTE ESTERASE UR QL STRIP.AUTO: NEGATIVE
LIPASE SERPL-CCNC: 46 U/L (ref 13–60)
LYMPHOCYTES # BLD AUTO: 4.07 10*3/MM3 (ref 0.7–3.1)
LYMPHOCYTES NFR BLD AUTO: 29.5 % (ref 19.6–45.3)
MCH RBC QN AUTO: 28.2 PG (ref 26.6–33)
MCHC RBC AUTO-ENTMCNC: 33.4 G/DL (ref 31.5–35.7)
MCV RBC AUTO: 84.4 FL (ref 79–97)
MONOCYTES # BLD AUTO: 0.59 10*3/MM3 (ref 0.1–0.9)
MONOCYTES NFR BLD AUTO: 4.3 % (ref 5–12)
MUCOUS THREADS URNS QL MICRO: ABNORMAL /HPF
NEUTROPHILS NFR BLD AUTO: 63.5 % (ref 42.7–76)
NEUTROPHILS NFR BLD AUTO: 8.77 10*3/MM3 (ref 1.7–7)
NITRITE UR QL STRIP: NEGATIVE
NITRITE UR QL STRIP: NEGATIVE
NRBC BLD AUTO-RTO: 0 /100 WBC (ref 0–0.2)
PH UR STRIP.AUTO: 6 [PH] (ref 5–8)
PH UR STRIP.AUTO: 6 [PH] (ref 5–8)
PLATELET # BLD AUTO: 441 10*3/MM3 (ref 140–450)
PMV BLD AUTO: 9 FL (ref 6–12)
POTASSIUM SERPL-SCNC: 3.3 MMOL/L (ref 3.5–5.2)
PROT SERPL-MCNC: 7.4 G/DL (ref 6–8.5)
PROT UR QL STRIP: ABNORMAL
PROT UR QL STRIP: NEGATIVE
RBC # BLD AUTO: 5 10*6/MM3 (ref 3.77–5.28)
RBC # UR STRIP: ABNORMAL /HPF
RBC # UR STRIP: NORMAL /HPF
REF LAB TEST METHOD: ABNORMAL
REF LAB TEST METHOD: NORMAL
SODIUM SERPL-SCNC: 140 MMOL/L (ref 136–145)
SP GR UR STRIP: >1.03 (ref 1–1.03)
SP GR UR STRIP: >1.03 (ref 1–1.03)
SQUAMOUS #/AREA URNS HPF: ABNORMAL /HPF
SQUAMOUS #/AREA URNS HPF: NORMAL /HPF
TRANS CELLS #/AREA URNS HPF: ABNORMAL /HPF
UROBILINOGEN UR QL STRIP: ABNORMAL
UROBILINOGEN UR QL STRIP: ABNORMAL
WBC # UR STRIP: ABNORMAL /HPF
WBC # UR STRIP: NORMAL /HPF
WBC NRBC COR # BLD AUTO: 13.79 10*3/MM3 (ref 3.4–10.8)
WHOLE BLOOD HOLD COAG: NORMAL
WHOLE BLOOD HOLD SPECIMEN: NORMAL

## 2024-04-11 PROCEDURE — 87510 GARDNER VAG DNA DIR PROBE: CPT | Performed by: NURSE PRACTITIONER

## 2024-04-11 PROCEDURE — 87480 CANDIDA DNA DIR PROBE: CPT | Performed by: NURSE PRACTITIONER

## 2024-04-11 PROCEDURE — 87660 TRICHOMONAS VAGIN DIR PROBE: CPT | Performed by: NURSE PRACTITIONER

## 2024-04-11 PROCEDURE — 87591 N.GONORRHOEAE DNA AMP PROB: CPT | Performed by: NURSE PRACTITIONER

## 2024-04-11 PROCEDURE — 87491 CHLMYD TRACH DNA AMP PROBE: CPT | Performed by: NURSE PRACTITIONER

## 2024-04-11 PROCEDURE — 81001 URINALYSIS AUTO W/SCOPE: CPT

## 2024-04-11 PROCEDURE — 83690 ASSAY OF LIPASE: CPT

## 2024-04-11 PROCEDURE — 25010000002 ONDANSETRON PER 1 MG: Performed by: REGISTERED NURSE

## 2024-04-11 PROCEDURE — 36415 COLL VENOUS BLD VENIPUNCTURE: CPT

## 2024-04-11 PROCEDURE — 74177 CT ABD & PELVIS W/CONTRAST: CPT

## 2024-04-11 PROCEDURE — 85025 COMPLETE CBC W/AUTO DIFF WBC: CPT

## 2024-04-11 PROCEDURE — 84702 CHORIONIC GONADOTROPIN TEST: CPT

## 2024-04-11 PROCEDURE — 96374 THER/PROPH/DIAG INJ IV PUSH: CPT

## 2024-04-11 PROCEDURE — 25510000001 IOPAMIDOL PER 1 ML

## 2024-04-11 PROCEDURE — 99285 EMERGENCY DEPT VISIT HI MDM: CPT

## 2024-04-11 PROCEDURE — 80053 COMPREHEN METABOLIC PANEL: CPT

## 2024-04-11 PROCEDURE — 81001 URINALYSIS AUTO W/SCOPE: CPT | Performed by: REGISTERED NURSE

## 2024-04-11 PROCEDURE — 87086 URINE CULTURE/COLONY COUNT: CPT | Performed by: NURSE PRACTITIONER

## 2024-04-11 RX ORDER — SODIUM CHLORIDE 0.9 % (FLUSH) 0.9 %
10 SYRINGE (ML) INJECTION AS NEEDED
Status: DISCONTINUED | OUTPATIENT
Start: 2024-04-11 | End: 2024-04-12 | Stop reason: HOSPADM

## 2024-04-11 RX ORDER — ONDANSETRON 4 MG/1
4 TABLET, ORALLY DISINTEGRATING ORAL EVERY 8 HOURS PRN
Qty: 30 TABLET | Refills: 0 | Status: SHIPPED | OUTPATIENT
Start: 2024-04-11 | End: 2024-04-21

## 2024-04-11 RX ORDER — DICYCLOMINE HYDROCHLORIDE 10 MG/1
20 CAPSULE ORAL ONCE
Status: COMPLETED | OUTPATIENT
Start: 2024-04-11 | End: 2024-04-11

## 2024-04-11 RX ORDER — ONDANSETRON 2 MG/ML
4 INJECTION INTRAMUSCULAR; INTRAVENOUS ONCE
Status: COMPLETED | OUTPATIENT
Start: 2024-04-11 | End: 2024-04-11

## 2024-04-11 RX ORDER — DICYCLOMINE HCL 20 MG
20 TABLET ORAL EVERY 6 HOURS
Qty: 20 TABLET | Refills: 0 | Status: SHIPPED | OUTPATIENT
Start: 2024-04-11

## 2024-04-11 RX ADMIN — ONDANSETRON 4 MG: 2 INJECTION INTRAMUSCULAR; INTRAVENOUS at 22:37

## 2024-04-11 RX ADMIN — IOPAMIDOL 90 ML: 755 INJECTION, SOLUTION INTRAVENOUS at 21:38

## 2024-04-11 RX ADMIN — DICYCLOMINE HYDROCHLORIDE 20 MG: 10 CAPSULE ORAL at 22:36

## 2024-04-11 NOTE — TELEPHONE ENCOUNTER
Caller: Bing Morton    Relationship: Self    Best call back number: [phone]    What orders are you requesting (i.e. lab or imaging): ULTRASOUND OR XRAY    In what timeframe would the patient need to come in: ASAP - TODAY     Where will you receive your lab/imaging services:  N/A     Additional notes:       THE PATIENT SAID SHE HAS BEEN HAVING STOMACH PAIN ON HER LEFT SIDE SINCE SUNDAY 4/7/24. SHE IS WANTING TO KNOW IF PCP WOULD PUT IN AN ORDER FOR A X-RAY OR AND ULTRASOUND. SHE SAID SHE IS IN A LOT OF PAIN       SHE WOULD LIKE A CALL TO ADVISE

## 2024-04-11 NOTE — TELEPHONE ENCOUNTER
Spoke to pt and let them know with left sided pain they would need to go to the ER to rule out anything urgent. Pt stated she went to urgent care this morning, and was advised it could be an ovarian cyst or pancreatitis and was hoping we could just put in for imaging. Again advised the pt with the type of pain they would need to go to the ER to be evaluated due to the severity of conditions it could be. Pt verbalized understanding

## 2024-04-12 NOTE — DISCHARGE INSTRUCTIONS
Lab work and CT scan did not show any concerning findings today.  Take Bentyl and Zofran as needed for symptoms.    Please note that your care in the Emergency Department today is not a substitute for good follow-up and a comprehensive evaluation through a primary care physician.  There are no immediate, life threatening causes for your symptoms at this time.  Symptoms can change and new problems may arise after your evaluation.  Please do not hesitate to return for further care.    Please return to the nearest ED if your symptoms worsen or if you experience any chest pain, problems breathing, fevers, chills, nausea, vomiting, abdominal pain, weakness, abnormal swelling, severe headache, or other concerning symptoms.  Please take all medications as directed for the full prescribed course and stay well hydrated.    Follow up with your primary care physician or any other physicians as listed in the follow-up section as directed.    
Spontaneous, unlabored and symmetrical

## 2024-04-12 NOTE — ED PROVIDER NOTES
Time: 9:14 PM EDT  Date of encounter:  4/11/2024  Independent Historian/Clinical History and Information was obtained by:   Patient    History is limited by: N/A    Chief Complaint: Abdominal pain      History of Present Illness:  Patient is a 33 y.o. year old female who presents to the emergency department for evaluation of left upper quadrant abdominal pain that started Saturday.  Admits to nausea, vomiting diarrhea.   Denies suspicious food intake or recent travel.  Rand VILLANUEVA    Patient reports that she was seen at urgent care on Sunday where she was diagnosed with strep throat.  She has been taking antibiotics since then.  Denies fever/chills now.  Reports that sore throat has improved significantly.  Denies bloody stools OBI Tobias      HPI    Patient Care Team  Primary Care Provider: Ace Merino DO    Past Medical History:     Allergies   Allergen Reactions    Sulfamethoxazole-Trimethoprim Anaphylaxis and Swelling    Heparin Rash     Patient was told to take benadryl with Heparin. Patient states she had a rash and was told to take benedryl to help with Rash and itchy throat      Past Medical History:   Diagnosis Date    ADHD 11/22/2021    Asthma     History of pulmonary embolism     on OCPs    Lupus anticoagulant positive     Migraines     Urinary incontinence 03/18/2021     History reviewed. No pertinent surgical history.  Family History   Problem Relation Age of Onset    Diabetes Mother     Diabetes Maternal Aunt     Heart disease Other        Home Medications:  Prior to Admission medications    Medication Sig Start Date End Date Taking? Authorizing Provider   amoxicillin (AMOXIL) 500 MG capsule Take 1 capsule by mouth 2 (Two) Times a Day. 4/7/24   Belinda Falk MD   ferrous gluconate (FERGON) 324 MG tablet Take 1 tablet by mouth Daily With Breakfast. 11/1/23   Nancy Jose APRN   fluconazole (DIFLUCAN) 150 MG tablet  3/17/24   Provider, MD Luciana   fluticasone (FLONASE) 50  MCG/ACT nasal spray 2 sprays into the nostril(s) as directed by provider Daily. 10/9/23   Joya Christianson APRN   Insulin Pen Needle 31G X 6 MM misc 1 each 1 (One) Time Per Week. 12/21/22   Ace Merino DO   meloxicam (Mobic) 15 MG tablet Take 1 tablet by mouth Daily. 3/19/24   Ace Merino DO   ondansetron ODT (ZOFRAN-ODT) 4 MG disintegrating tablet Place 1 tablet on the tongue Every 8 (Eight) Hours As Needed for Nausea or Vomiting. 10/9/23   Joya Christianson APRN   Tirzepatide (Mounjaro) 2.5 MG/0.5ML solution pen-injector pen INJECT 0.5 ML SUBCUTANEOUSLY INTO THE APPROPRIATE AREA AS DIRECTED ONCE WEEKLY 2/26/24   Ace Merino DO   nitrofurantoin, macrocrystal-monohydrate, (Macrobid) 100 MG capsule Take 1 capsule by mouth 2 (Two) Times a Day. 2/27/24 4/11/24  Bing Valladares APRN   phenazopyridine (Pyridium) 100 MG tablet Take 1 tablet by mouth 3 (Three) Times a Day. 2/27/24 4/11/24  Bing Valladares APRN        Social History:   Social History     Tobacco Use    Smoking status: Never     Passive exposure: Never    Smokeless tobacco: Never   Vaping Use    Vaping status: Never Used   Substance Use Topics    Alcohol use: Yes     Comment: occasional    Drug use: Never         Review of Systems:  Review of Systems   Constitutional:  Negative for chills and fever.   HENT:  Negative for congestion, ear pain and sore throat.    Eyes:  Negative for pain.   Respiratory:  Negative for cough, chest tightness and shortness of breath.    Cardiovascular:  Negative for chest pain.   Gastrointestinal:  Positive for abdominal pain, diarrhea, nausea and vomiting.   Genitourinary:  Positive for vaginal bleeding. Negative for flank pain and hematuria.   Musculoskeletal:  Negative for joint swelling.   Skin:  Negative for pallor.   Neurological:  Negative for seizures and headaches.   All other systems reviewed and are negative.       Physical Exam:  /63 (Patient Position: Lying)   Pulse 62   Temp 98.7  "°F (37.1 °C) (Oral)   Resp 16   Ht 165.1 cm (65\")   Wt 109 kg (241 lb 2.9 oz)   LMP 04/08/2024 (Approximate)   SpO2 99%   BMI 40.13 kg/m²     Physical Exam  Vitals and nursing note reviewed.   Constitutional:       General: She is not in acute distress.     Appearance: Normal appearance. She is morbidly obese. She is not toxic-appearing.   HENT:      Head: Normocephalic and atraumatic.      Mouth/Throat:      Mouth: Mucous membranes are moist.   Eyes:      General: No scleral icterus.     Pupils: Pupils are equal, round, and reactive to light.   Cardiovascular:      Rate and Rhythm: Normal rate and regular rhythm.      Pulses:           Radial pulses are 2+ on the right side and 2+ on the left side.      Heart sounds: Normal heart sounds.   Pulmonary:      Effort: Pulmonary effort is normal. No respiratory distress.      Breath sounds: Normal breath sounds. No wheezing or rhonchi.   Abdominal:      General: Abdomen is protuberant. Bowel sounds are normal. There is no distension.      Palpations: Abdomen is soft.      Tenderness: There is abdominal tenderness in the left upper quadrant and left lower quadrant. There is no right CVA tenderness, left CVA tenderness, guarding or rebound.   Musculoskeletal:         General: Normal range of motion.      Cervical back: Normal range of motion and neck supple.   Skin:     General: Skin is warm and dry.   Neurological:      General: No focal deficit present.      Mental Status: She is alert and oriented to person, place, and time. Mental status is at baseline.   Psychiatric:         Mood and Affect: Mood normal.         Behavior: Behavior normal.                Procedures:  Procedures      Medical Decision Making:      Comorbidities that affect care:    Asthma, Obesity    External Notes reviewed:    Previous Clinic Note: Urgent care today      The following orders were placed and all results were independently analyzed by me:  Orders Placed This Encounter   Procedures "    CT Abdomen Pelvis With Contrast    [place] Draw    Comprehensive Metabolic Panel    Lipase    Urinalysis With Microscopic If Indicated (No Culture) - Urine, Clean Catch    hCG, Quantitative, Pregnancy    CBC Auto Differential    Urinalysis, Microscopic Only - Urine, Clean Catch    Urinalysis With Microscopic If Indicated (No Culture) - Urine, Clean Catch    Urinalysis, Microscopic Only - Urine, Clean Catch    NPO Diet NPO Type: Strict NPO    Undress & Gown    Insert Peripheral IV    CBC & Differential    Green Top (Gel)    Lavender Top    Gold Top - SST    Light Blue Top       Medications Given in the Emergency Department:  Medications   sodium chloride 0.9 % flush 10 mL (has no administration in time range)   iopamidol (ISOVUE-370) 76 % injection 100 mL (90 mL Intravenous Given 4/11/24 2138)   ondansetron (ZOFRAN) injection 4 mg (4 mg Intravenous Given 4/11/24 2237)   dicyclomine (BENTYL) capsule 20 mg (20 mg Oral Given 4/11/24 2236)        ED Course:    ED Course as of 04/11/24 2314   Thu Apr 11, 2024 2115 --- PROVIDER IN TRIAGE NOTE ---    The patient was evaluated by Rand claros in triage. Orders were placed and the patient is currently awaiting disposition.    [AJ]      ED Course User Index  [AJ] Rand Rivera PA-C       Labs:    Lab Results (last 24 hours)       Procedure Component Value Units Date/Time    POC Urinalysis Dipstick, Multipro (Automated Dipstick) [[id number]]  (Abnormal) Collected: 04/11/24 0850    Specimen: Urine Updated: 04/11/24 0851     Color Dark Yellow     Clarity, UA Cloudy     Glucose, UA Negative mg/dL      Bilirubin Small (1+)     Ketones, UA Trace     Specific Gravity  1.025     Blood, UA Large     pH, Urine 6.0     Protein, POC 30 mg/dL mg/dL      Urobilinogen, UA 0.2 E.U./dL     Nitrite, UA Negative     Leukocytes Small (1+)    POC Pregnancy, Urine [[id number]]  (Normal) Collected: 04/11/24 0850    Specimen: Urine Updated: 04/11/24 0851     HCG, Urine, QL Negative      Lot Number [id number]     Internal Positive Control Passed     Internal Negative Control Passed     Expiration Date 04/08/2025    Gardnerella vaginalis, Trichomonas vaginalis, Candida albicans, DNA - Swab, Vagina [[id number]]  (Normal) Collected: 04/11/24 0851    Specimen: Swab from Vagina Updated: 04/11/24 1335     GARDNERELLA VAGINALIS Negative     TRICHOMONAS VAGINALIS Negative     CANDIDA SPECIES Negative    Chlamydia trachomatis, Neisseria gonorrhoeae, PCR - Urine, Urine, Random Void [[id number]]  (Normal) Collected: 04/11/24 0851    Specimen: Urine, Random Void Updated: 04/11/24 1352     Chlamydia DNA by PCR Not Detected     Neisseria gonorrhoeae by PCR Not Detected    Urine Culture - Urine, Urine, Clean Catch [[id number]] Collected: 04/11/24 0857    Specimen: Urine, Clean Catch Updated: 04/11/24 0932    CBC & Differential [[id number]]  (Abnormal) Collected: 04/11/24 2022    Specimen: Blood from Arm, Right Updated: 04/11/24 2029    Narrative:      The following orders were created for panel order CBC & Differential.  Procedure                               Abnormality         Status                     ---------                               -----------         ------                     CBC Auto Differential[[id number]]        Abnormal            Final result                 Please view results for these tests on the individual orders.    Comprehensive Metabolic Panel [[id number]]  (Abnormal) Collected: 04/11/24 2022    Specimen: Blood from Arm, Right Updated: 04/11/24 2048     Glucose 89 mg/dL      BUN 11 mg/dL      Creatinine 0.88 mg/dL      Sodium 140 mmol/L      Potassium 3.3 mmol/L      Chloride 101 mmol/L      CO2 28.7 mmol/L      Calcium 8.6 mg/dL      Total Protein 7.4 g/dL      Albumin 3.8 g/dL      ALT (SGPT) 22 U/L      AST (SGOT) 20 U/L      Alkaline Phosphatase 85 U/L      Total Bilirubin 0.4 mg/dL      Globulin 3.6 gm/dL      A/G Ratio 1.1 g/dL      BUN/Creatinine Ratio 12.5     Anion Gap 10.3 mmol/L       eGFR 89.1 mL/min/1.73     Narrative:      GFR Normal >60  Chronic Kidney Disease <60  Kidney Failure <15      Lipase [[id number]]  (Normal) Collected: 04/11/24 2022    Specimen: Blood from Arm, Right Updated: 04/11/24 2048     Lipase 46 U/L     Urinalysis With Microscopic If Indicated (No Culture) - Urine, Clean Catch [[id number]]  (Abnormal) Collected: 04/11/24 2022    Specimen: Urine, Clean Catch Updated: 04/11/24 2045     Color, UA Dark Yellow     Appearance, UA Cloudy     pH, UA 6.0     Specific Gravity, UA >1.030     Glucose, UA Negative     Ketones, UA Negative     Bilirubin, UA Small (1+)     Blood, UA Large (3+)     Protein, UA 30 mg/dL (1+)     Leuk Esterase, UA Small (1+)     Nitrite, UA Negative     Urobilinogen, UA 1.0 E.U./dL    hCG, Quantitative, Pregnancy [[id number]] Collected: 04/11/24 2022    Specimen: Blood from Arm, Right Updated: 04/11/24 2049     HCG Quantitative <0.50 mIU/mL     Narrative:      HCG Ranges by Gestational Age    Females - non-pregnant premenopausal   </= 1mIU/mL HCG  Females - postmenopausal               </= 7mIU/mL HCG    3 Weeks       5.4   -      72 mIU/mL  4 Weeks      10.2   -     708 mIU/mL  5 Weeks       217   -   8,245 mIU/mL  6 Weeks       152   -  32,177 mIU/mL  7 Weeks     4,059   - 153,767 mIU/mL  8 Weeks    31,366   - 149,094 mIU/mL  9 Weeks    59,109   - 135,901 mIU/mL  10 Weeks   44,186   - 170,409 mIU/mL  12 Weeks   27,107   - 201,615 mIU/mL  14 Weeks   24,302   -  93,646 mIU/mL  15 Weeks   12,540   -  69,747 mIU/mL  16 Weeks    8,904   -  55,332 mIU/mL  17 Weeks    8,240   -  51,793 mIU/mL  18 Weeks    9,649   -  55,271 mIU/mL      CBC Auto Differential [[id number]]  (Abnormal) Collected: 04/11/24 2022    Specimen: Blood from Arm, Right Updated: 04/11/24 2029     WBC 13.79 10*3/mm3      RBC 5.00 10*6/mm3      Hemoglobin 14.1 g/dL      Hematocrit 42.2 %      MCV 84.4 fL      MCH 28.2 pg      MCHC 33.4 g/dL      RDW 12.4 %      RDW-SD 37.8 fl      MPV 9.0 fL       Platelets 441 10*3/mm3      Neutrophil % 63.5 %      Lymphocyte % 29.5 %      Monocyte % 4.3 %      Eosinophil % 1.4 %      Basophil % 0.6 %      Immature Grans % 0.7 %      Neutrophils, Absolute 8.77 10*3/mm3      Lymphocytes, Absolute 4.07 10*3/mm3      Monocytes, Absolute 0.59 10*3/mm3      Eosinophils, Absolute 0.19 10*3/mm3      Basophils, Absolute 0.08 10*3/mm3      Immature Grans, Absolute 0.09 10*3/mm3      nRBC 0.0 /100 WBC     Urinalysis, Microscopic Only - Urine, Clean Catch [[id number]]  (Abnormal) Collected: 04/11/24 2022    Specimen: Urine, Clean Catch Updated: 04/11/24 2102     RBC, UA 11-20 /HPF      WBC, UA 0-2 /HPF      Bacteria, UA Trace /HPF      Squamous Epithelial Cells, UA 13-20 /HPF      Transitional Epithelial Cells, UA 3-6 /HPF      Hyaline Casts, UA 3-6 /LPF      Mucus, UA Moderate/2+ /HPF      Methodology Manual Light Microscopy    Urinalysis With Microscopic If Indicated (No Culture) - Urine, Clean Catch [[id number]]  (Abnormal) Collected: 04/11/24 2247    Specimen: Urine, Clean Catch Updated: 04/11/24 2257     Color, UA Yellow     Appearance, UA Clear     pH, UA 6.0     Specific Gravity, UA >1.030     Glucose, UA Negative     Ketones, UA Negative     Bilirubin, UA Negative     Blood, UA Small (1+)     Protein, UA Negative     Leuk Esterase, UA Negative     Nitrite, UA Negative     Urobilinogen, UA 1.0 E.U./dL    Urinalysis, Microscopic Only - Urine, Clean Catch [[id number]] Collected: 04/11/24 2247    Specimen: Urine, Clean Catch Updated: 04/11/24 2257     RBC, UA 0-2 /HPF      WBC, UA 0-2 /HPF      Bacteria, UA None Seen /HPF      Squamous Epithelial Cells, UA 0-2 /HPF      Hyaline Casts, UA 3-6 /LPF      Methodology Automated Microscopy             Imaging:    CT Abdomen Pelvis With Contrast    Result Date: 4/11/2024  CT ABDOMEN PELVIS W CONTRAST-  Date of Exam: 4/11/2024 9:35 PM  Indication: Flank pain, kidney stone suspected.  Comparison: None available.  Technique: Axial CT images  were obtained of the abdomen and pelvis following the uneventful intravenous administration of 90 mL Isovue-370. Reconstructed coronal and sagittal images were also obtained. Automated exposure control and iterative construction methods were used.  FINDINGS: The lung bases are clear without evidence for focal consolidation or significant pleural effusion.  There is evidence for diffuse hepatic fatty infiltration. Otherwise the liver, spleen, and pancreas are unremarkable. A prominent gallstone is seen in the gallbladder. The gallbladder is otherwise unremarkable. No biliary dilatation is seen. The bilateral adrenal glands are symmetric and unremarkable. The bilateral kidneys are symmetric and unremarkable.  There is no bowel dilatation or obstruction. A normal-appearing appendix is observed. There is no evidence for acute appendicitis. No significant free fluid is observed. No abnormal fluid collections are identified. No significant lymphadenopathy is seen throughout the abdomen or pelvis. The bladder is partially decompressed. The celiac and superior mesenteric arterial distributions are opacified without evidence for occlusion.  No acute osseous abnormalities are observed.      1.  No evidence for acute abnormality throughout the abdomen or pelvis. 2.  Evidence for cholelithiasis without signs of cholecystitis. No biliary dilatation is seen.   Electronically Signed By-Carlos Bridges MD On:4/11/2024 10:07 PM         Differential Diagnosis and Discussion:    Abdominal Pain: Based on the patient's signs and symptoms, I considered abdominal aortic aneurysm, small bowel obstruction, pancreatitis, acute cholecystitis, acute appendecitis, peptic ulcer disease, gastritis, colitis, endocrine disorders, irritable bowel syndrome and other differential diagnosis an etiology of the patient's abdominal pain.    All labs were reviewed and interpreted by me.  CT scan radiology impression was interpreted by me.    MDM  Number of  Diagnoses or Management Options  Abdominal pain, unspecified abdominal location  Nausea and vomiting, unspecified vomiting type  Diagnosis management comments: CT scan abdomen pelvis is negative for acute intra-abdominal pathology.  The patient has a soft and benign abdominal exam. The patient is now resting comfortably and feels better, is alert, and is in no distress. The patient is able to tolerate po intake in the ED. The patient´s labs were reviewed and hydration status was assessed. The patient has no signs of acute renal failure, sepsis, or dehydration that warrants admission to the hospital.  Initial urinalysis appeared contaminated so it was repeated.  The vital signs have been stable. The patient's condition is stable and appropriate for discharge. The patient will pursue further outpatient evaluation with the primary care physician or designated physician. The patient and/or caregivers have expressed a clear and thorough understanding and agreed to follow-up as instructed.       Amount and/or Complexity of Data Reviewed  Clinical lab tests: reviewed and ordered  Tests in the radiology section of CPT®: reviewed and ordered  Decide to obtain previous medical records or to obtain history from someone other than the patient: yes    Risk of Complications, Morbidity, and/or Mortality  Presenting problems: low  Diagnostic procedures: minimal  Management options: minimal    Patient Progress  Patient progress: improved                 Patient Care Considerations:    SEPSIS was considered but is NOT present in the emergency department as SIRS criteria is not present.      Consultants/Shared Management Plan:        Social Determinants of Health:    Patient is independent, reliable, and has access to care.       Disposition and Care Coordination:    Discharged: The patient is suitable and stable for discharge with no need for consideration of admission.    I have explained discharge medications and the need for  follow up with the patient/caretakers. This was also printed in the discharge instructions. Patient was discharged with the following medications and follow up:      Medication List        New Prescriptions      dicyclomine 20 MG tablet  Commonly known as: BENTYL  Take 1 tablet by mouth Every 6 (Six) Hours.               Where to Get Your Medications        These medications were sent to [facility]/pharmacy #[id number] - [place], [place] - [address] - [phone]  - [phone]   [address] [place] [address] [place] [address]      Hours: 24-hours Phone: [phone]   dicyclomine 20 MG tablet  ondansetron ODT 4 MG disintegrating tablet      Ace Merino, DO  [address]  [address] [address] [phone]    Call   As needed       Final diagnoses:   Abdominal pain, unspecified abdominal location   Nausea and vomiting, unspecified vomiting type        ED Disposition       ED Disposition   Discharge    Condition   Stable    Comment   --               This medical record created using voice recognition software.             Nora Sheth, APRN  04/11/24 4376

## 2024-04-15 ENCOUNTER — OFFICE VISIT (OUTPATIENT)
Dept: FAMILY MEDICINE CLINIC | Facility: CLINIC | Age: 34
End: 2024-04-15
Payer: COMMERCIAL

## 2024-04-15 VITALS
TEMPERATURE: 99.6 F | HEIGHT: 65 IN | OXYGEN SATURATION: 98 % | WEIGHT: 240.2 LBS | HEART RATE: 57 BPM | SYSTOLIC BLOOD PRESSURE: 116 MMHG | DIASTOLIC BLOOD PRESSURE: 65 MMHG | BODY MASS INDEX: 40.02 KG/M2

## 2024-04-15 DIAGNOSIS — E66.09 CLASS 2 OBESITY DUE TO EXCESS CALORIES WITHOUT SERIOUS COMORBIDITY WITH BODY MASS INDEX (BMI) OF 38.0 TO 38.9 IN ADULT: Chronic | ICD-10-CM

## 2024-04-15 DIAGNOSIS — R10.2 PELVIC PAIN: ICD-10-CM

## 2024-04-15 DIAGNOSIS — N94.10 PAIN IN FEMALE GENITALIA ON INTERCOURSE: Primary | ICD-10-CM

## 2024-04-15 DIAGNOSIS — M54.50 LEFT-SIDED LOW BACK PAIN WITHOUT SCIATICA, UNSPECIFIED CHRONICITY: ICD-10-CM

## 2024-04-15 PROCEDURE — 99214 OFFICE O/P EST MOD 30 MIN: CPT

## 2024-04-15 RX ORDER — CYCLOBENZAPRINE HCL 10 MG
10 TABLET ORAL 3 TIMES DAILY PRN
Qty: 21 TABLET | Refills: 0 | Status: SHIPPED | OUTPATIENT
Start: 2024-04-15 | End: 2024-04-22

## 2024-04-15 NOTE — PROGRESS NOTES
"Chief Complaint  Abdominal Pain (Since Saturday, travels along the side to the lower back and then sometimes to the top of stomach) and Shoulder Pain (Mid to upper back on left side, felt tight and hard to breath )    Subjective        Bing Poe presents to [facility]  History of Present Illness  Bing is here today for abdominal pain. States she is had painful intercosre but just on the left side. States after intercorse there was blood as well then theses symptoms started. States Monday it was like her period started but it was about a week early and only lasted 3 days. No intercourse since.   States this has been going on for 8 days now. Sharp LLQ pain that shoots to the back. Hurts worse when she eats and causes diarrhea. Soda makes her feel more bubbly. Yesterday and sharp pain on left back around bra strap. States she has tried tylenol at home with no relief.       Objective   Vital Signs:  /65 (BP Location: Left arm, Patient Position: Sitting, Cuff Size: Adult)   Pulse 57   Temp 99.6 °F (37.6 °C)   Ht 165.1 cm (65\")   Wt 109 kg (240 lb 3.2 oz)   SpO2 98%   BMI 39.97 kg/m²   Estimated body mass index is 39.97 kg/m² as calculated from the following:    Height as of this encounter: 165.1 cm (65\").    Weight as of this encounter: 109 kg (240 lb 3.2 oz).               Physical Exam  Vitals and nursing note reviewed.   Constitutional:       Appearance: Normal appearance. She is obese.   Cardiovascular:      Pulses: Normal pulses.      Heart sounds: Normal heart sounds.   Pulmonary:      Effort: Pulmonary effort is normal.      Breath sounds: Normal breath sounds.   Abdominal:      General: Bowel sounds are decreased.      Palpations: Abdomen is soft.      Tenderness: There is abdominal tenderness. There is left CVA tenderness.   Skin:     General: Skin is warm and dry.   Neurological:      General: No focal deficit present.      Mental Status: She is " alert and oriented to person, place, and time.   Psychiatric:         Mood and Affect: Mood normal.         Behavior: Behavior normal.        Result Review :                     Assessment and Plan     Diagnoses and all orders for this visit:    1. Pain in female genitalia on intercourse (Primary)  -     US Non-ob Transvaginal; Future    2. Pelvic pain  -     US Non-ob Transvaginal; Future    3. Left-sided low back pain without sciatica, unspecified chronicity  -     cyclobenzaprine (FLEXERIL) 10 MG tablet; Take 1 tablet by mouth 3 (Three) Times a Day As Needed for Muscle Spasms for up to 7 days.  Dispense: 21 tablet; Refill: 0    4. Class 2 obesity due to excess calories without serious comorbidity with body mass index (BMI) of 38.0 to 38.9 in adult  Assessment & Plan:  Patient's (Body mass index is 39.97 kg/m².) indicates that they are morbidly/severely obese (BMI > 40 or > 35 with obesity - related health condition) with health conditions that include none . Weight is unchanged. BMI  is above average; BMI management plan is completed. We discussed portion control and increasing exercise.                Follow Up     Return if symptoms worsen or fail to improve.  Patient was given instructions and counseling regarding her condition or for health maintenance advice. Please see specific information pulled into the AVS if appropriate.

## 2024-04-15 NOTE — ASSESSMENT & PLAN NOTE
Patient's (Body mass index is 39.97 kg/m².) indicates that they are morbidly/severely obese (BMI > 40 or > 35 with obesity - related health condition) with health conditions that include none . Weight is unchanged. BMI  is above average; BMI management plan is completed. We discussed portion control and increasing exercise.

## 2024-04-29 ENCOUNTER — HOSPITAL ENCOUNTER (OUTPATIENT)
Dept: ULTRASOUND IMAGING | Facility: HOSPITAL | Age: 34
Discharge: HOME | End: 2024-04-29
Payer: COMMERCIAL

## 2024-04-29 DIAGNOSIS — N94.10 PAIN IN FEMALE GENITALIA ON INTERCOURSE: ICD-10-CM

## 2024-04-29 DIAGNOSIS — R10.2 PELVIC PAIN: ICD-10-CM

## 2024-04-29 PROCEDURE — 76830 TRANSVAGINAL US NON-OB: CPT

## 2024-05-06 ENCOUNTER — TELEPHONE (OUTPATIENT)
Dept: FAMILY MEDICINE CLINIC | Facility: CLINIC | Age: 34
End: 2024-05-06

## 2024-05-06 NOTE — TELEPHONE ENCOUNTER
Caller: Bing Morton    Relationship: Self    Best call back number: [phone]      What test was performed: ULTRASOUND     When was the test performed: 04/29/2024    Where was the test performed: [facility]     Additional notes: PATIENT HAS SEEN THE RESULTS IN MY CHART AND NEEDS TO DISCUSS WHAT THE NEXT STEP SHOULD BE     TEST WAS ORDERED BY BILL

## 2024-05-06 NOTE — TELEPHONE ENCOUNTER
PATIENT CALLED TO CHECK STATUS OF THIS REQUEST SHE HAS TO RETURN TO WORK TOMORROW AND WOULD REALLY LIKE AN ANSWER BEFORE THEN

## 2024-05-08 ENCOUNTER — TELEPHONE (OUTPATIENT)
Dept: FAMILY MEDICINE CLINIC | Facility: CLINIC | Age: 34
End: 2024-05-08
Payer: COMMERCIAL

## 2024-05-08 DIAGNOSIS — R10.2 PELVIC PAIN: ICD-10-CM

## 2024-05-08 DIAGNOSIS — N94.10 PAIN IN FEMALE GENITALIA ON INTERCOURSE: Primary | ICD-10-CM

## 2024-05-22 NOTE — PROGRESS NOTES
GYN Problem/Follow Up Visit    Chief Complaint   Patient presents with    Follow-up     Pain and bleeding during intercourse            HPI  Bing Poe is a 33 y.o. female, , who presents for left lower quadrant/pelvic pain for the past 2 months, initially sharp, now rare, not as intense. The pain is increased with intercourse, experiencing bleeding x 1 day after intercourse. Last experienced left sided pelvic pain 6 days ago with intercourse.   Was seen by PCP and imaging ordered. Pelvic ultrasound       Hx 5 vaginal births    Hx chronic bladder leakage and urinary frequency, has increased over past 2 months. Leak all day long without warning. Denies painful urination. Has seen [facility] and was prescribed oxybutinin last year, stopped medication, was not efficacious.     US Non-ob Transvaginal (2024 16:22)- small nabothian cyst    Denies vaginal dryness    Hx of diarrhea, intermittent    Additional OB/GYN History   Patient's last menstrual period was 2024 (approximate).  Current contraception: contraceptive methods: Condoms    Past Medical History:   Diagnosis Date    ADHD 2021    Asthma     History of pulmonary embolism     on OCPs    Lupus anticoagulant positive     Migraines     Urinary incontinence 2021      History reviewed. No pertinent surgical history.   Family History   Problem Relation Age of Onset    Diabetes Mother     Diabetes Maternal Aunt     Heart disease Other     Breast cancer Neg Hx     Ovarian cancer Neg Hx     Uterine cancer Neg Hx     Prostate cancer Neg Hx     Colon cancer Neg Hx      Allergies as of 2024 - Reviewed 2024   Allergen Reaction Noted    Sulfamethoxazole-trimethoprim Anaphylaxis and Swelling 2020    Heparin Rash 2021      The additional following portions of the patient's history were reviewed and updated as appropriate: allergies, current medications, past family history, past medical history,  "past social history, past surgical history, and problem list.    Review of Systems    See HPI for pertinent ROS    Objective   /85   Pulse 78   Ht 165.1 cm (65\")   Wt 110 kg (242 lb)   LMP 05/20/2024 (Approximate)   BMI 40.27 kg/m²     Physical Exam  Vitals and nursing note reviewed. Exam conducted with a chaperone present.   Constitutional:       Appearance: Normal appearance.   Cardiovascular:      Rate and Rhythm: Normal rate.   Pulmonary:      Effort: Pulmonary effort is normal.   Genitourinary:     General: Normal vulva.      Vagina: Normal.      Cervix: Normal.      Uterus: Normal.       Adnexa: Right adnexa normal and left adnexa normal.   Lymphadenopathy:      Lower Body: No right inguinal adenopathy. No left inguinal adenopathy.   Skin:     General: Skin is warm and dry.   Neurological:      Mental Status: She is alert and oriented to person, place, and time.            Assessment and Plan    Diagnoses and all orders for this visit:    1. Dyspareunia in female (Primary)  Comments:  Normal pelvic US, use of vaginal lube. Referral for pelvic floor rehab, uro, PCP, hx chronic and increasing urinary leakage s/p 5 vaginal births, vag cultures  Orders:  -     POC Pregnancy, Urine  -     Gardnerella vaginalis, Trichomonas vaginalis, Candida albicans, DNA - Swab, Vagina  -     Chlamydia trachomatis, Neisseria gonorrhoeae, PCR - Swab, Cervix  -     Urinalysis With Culture If Indicated - Urine, Clean Catch  -     Ambulatory Referral to Physical Therapy for Evaluation & Treatment    2. Mixed incontinence  Comments:  discuss bulking procedure, no improvement with oxybutinin  Orders:  -     Ambulatory Referral to Urology  -     Ambulatory Referral to Physical Therapy for Evaluation & Treatment    3. LLQ abdominal pain  Comments:  Recommend fu with PCP discuss GI nature of symptoms, possible GI referral  Orders:  -     Ambulatory Referral to Urology    4. Papanicolaou smear  Comments:  updated  Orders:  -     " IgP, Aptima HPV      HCG, Urine, QL   Date Value Ref Range Status   06/07/2024 Negative Negative Final   06/23/2022 Negative Negative Final       Counseling:  Plan of care as above      Follow Up:  Return if symptoms worsen or fail to improve.        Silvia Pearsno, APRN  06/07/2024

## 2024-06-03 DIAGNOSIS — E61.1 IRON DEFICIENCY: ICD-10-CM

## 2024-06-03 RX ORDER — FERROUS GLUCONATE 324(38)MG
1 TABLET ORAL
Qty: 30 TABLET | Refills: 3 | Status: SHIPPED | OUTPATIENT
Start: 2024-06-03 | End: 2024-06-07

## 2024-06-07 ENCOUNTER — OFFICE VISIT (OUTPATIENT)
Dept: OBSTETRICS AND GYNECOLOGY | Facility: CLINIC | Age: 34
End: 2024-06-07
Payer: COMMERCIAL

## 2024-06-07 VITALS
DIASTOLIC BLOOD PRESSURE: 85 MMHG | HEIGHT: 65 IN | HEART RATE: 78 BPM | BODY MASS INDEX: 40.32 KG/M2 | WEIGHT: 242 LBS | SYSTOLIC BLOOD PRESSURE: 123 MMHG

## 2024-06-07 DIAGNOSIS — N39.46 MIXED INCONTINENCE: ICD-10-CM

## 2024-06-07 DIAGNOSIS — N94.10 DYSPAREUNIA IN FEMALE: Primary | ICD-10-CM

## 2024-06-07 DIAGNOSIS — Z12.4 PAPANICOLAOU SMEAR: ICD-10-CM

## 2024-06-07 DIAGNOSIS — R10.32 LLQ ABDOMINAL PAIN: ICD-10-CM

## 2024-06-07 LAB
B-HCG UR QL: NEGATIVE
BACTERIA UR QL AUTO: ABNORMAL /HPF
BILIRUB UR QL STRIP: NEGATIVE
C TRACH RRNA CVX QL NAA+PROBE: NOT DETECTED
CANDIDA SPECIES: NEGATIVE
CLARITY UR: ABNORMAL
COLOR UR: YELLOW
EXPIRATION DATE: NORMAL
GARDNERELLA VAGINALIS: POSITIVE
GLUCOSE UR STRIP-MCNC: NEGATIVE MG/DL
HGB UR QL STRIP.AUTO: NEGATIVE
HOLD SPECIMEN: NORMAL
HYALINE CASTS UR QL AUTO: ABNORMAL /LPF
INTERNAL NEGATIVE CONTROL: NORMAL
INTERNAL POSITIVE CONTROL: NORMAL
KETONES UR QL STRIP: NEGATIVE
LEUKOCYTE ESTERASE UR QL STRIP.AUTO: ABNORMAL
Lab: NORMAL
N GONORRHOEA RRNA SPEC QL NAA+PROBE: NOT DETECTED
NITRITE UR QL STRIP: NEGATIVE
PH UR STRIP.AUTO: 6 [PH] (ref 5–8)
PROT UR QL STRIP: NEGATIVE
RBC # UR STRIP: ABNORMAL /HPF
REF LAB TEST METHOD: ABNORMAL
SP GR UR STRIP: 1.02 (ref 1–1.03)
SQUAMOUS #/AREA URNS HPF: ABNORMAL /HPF
T VAGINALIS DNA VAG QL PROBE+SIG AMP: NEGATIVE
UROBILINOGEN UR QL STRIP: ABNORMAL
WBC # UR STRIP: ABNORMAL /HPF

## 2024-06-07 PROCEDURE — 87491 CHLMYD TRACH DNA AMP PROBE: CPT | Performed by: NURSE PRACTITIONER

## 2024-06-07 PROCEDURE — 87510 GARDNER VAG DNA DIR PROBE: CPT | Performed by: NURSE PRACTITIONER

## 2024-06-07 PROCEDURE — 87086 URINE CULTURE/COLONY COUNT: CPT | Performed by: NURSE PRACTITIONER

## 2024-06-07 PROCEDURE — 87591 N.GONORRHOEAE DNA AMP PROB: CPT | Performed by: NURSE PRACTITIONER

## 2024-06-07 PROCEDURE — 87624 HPV HI-RISK TYP POOLED RSLT: CPT | Performed by: NURSE PRACTITIONER

## 2024-06-07 PROCEDURE — 87660 TRICHOMONAS VAGIN DIR PROBE: CPT | Performed by: NURSE PRACTITIONER

## 2024-06-07 PROCEDURE — 87480 CANDIDA DNA DIR PROBE: CPT | Performed by: NURSE PRACTITIONER

## 2024-06-07 PROCEDURE — 81001 URINALYSIS AUTO W/SCOPE: CPT | Performed by: NURSE PRACTITIONER

## 2024-06-07 PROCEDURE — G0123 SCREEN CERV/VAG THIN LAYER: HCPCS | Performed by: NURSE PRACTITIONER

## 2024-06-08 LAB — BACTERIA SPEC AEROBE CULT: NORMAL

## 2024-06-10 ENCOUNTER — PATIENT MESSAGE (OUTPATIENT)
Dept: OBSTETRICS AND GYNECOLOGY | Facility: CLINIC | Age: 34
End: 2024-06-10
Payer: COMMERCIAL

## 2024-06-10 RX ORDER — METRONIDAZOLE 7.5 MG/G
1 GEL VAGINAL
Qty: 70 G | Refills: 0 | Status: SHIPPED | OUTPATIENT
Start: 2024-06-10 | End: 2024-06-15

## 2024-06-11 LAB
CYTOLOGIST CVX/VAG CYTO: NORMAL
CYTOLOGY CVX/VAG DOC CYTO: NORMAL
CYTOLOGY CVX/VAG DOC THIN PREP: NORMAL
DX ICD CODE: NORMAL
HPV I/H RISK 4 DNA CVX QL PROBE+SIG AMP: NEGATIVE
Lab: NORMAL
OTHER STN SPEC: NORMAL
STAT OF ADQ CVX/VAG CYTO-IMP: NORMAL

## 2024-07-03 ENCOUNTER — TELEPHONE (OUTPATIENT)
Dept: OBSTETRICS AND GYNECOLOGY | Facility: CLINIC | Age: 34
End: 2024-07-03
Payer: COMMERCIAL

## 2024-07-03 NOTE — TELEPHONE ENCOUNTER
Patient called this pm. She spoke with someone @ [facility].  I have attached her note. Last filled 6/10/24 Metrogel # 70 with no refills

## 2024-07-03 NOTE — TELEPHONE ENCOUNTER
Caller: TOBIN CHACON     Relationship to patient: SELF    Best call back number: [phone] (home)       Patient is needing: PT WAS PRESCRIBED   metroNIDAZOLE 0.75 % LAST VISIT FOR BV, IS STILL EXPERIENCING SYMPTOMS. SYMPTOMS INCLUDE: TINGLING, INCREASE IN URINATION, NAUSEOUS. NO PAIN. PT ALSO STATES THAT SHE HAS HAD A VERY LIGHT PERIOD THIS MONTH THAT ONLY LAST 2 DAYS AND ONLY HAD A SMALL AMOUNT OF BLOOD.

## 2024-07-16 ENCOUNTER — TELEMEDICINE (OUTPATIENT)
Dept: FAMILY MEDICINE CLINIC | Facility: TELEHEALTH | Age: 34
End: 2024-07-16
Payer: COMMERCIAL

## 2024-07-16 DIAGNOSIS — R19.8 GI PROBLEM: Primary | ICD-10-CM

## 2024-07-16 RX ORDER — FERROUS GLUCONATE 324(38)MG
TABLET ORAL
COMMUNITY
Start: 2024-07-06

## 2024-07-16 RX ORDER — LOPERAMIDE HYDROCHLORIDE 2 MG/1
2 TABLET ORAL 4 TIMES DAILY PRN
Qty: 12 TABLET | Refills: 0 | Status: SHIPPED | OUTPATIENT
Start: 2024-07-16

## 2024-07-16 RX ORDER — ONDANSETRON 8 MG/1
8 TABLET, ORALLY DISINTEGRATING ORAL EVERY 8 HOURS PRN
Qty: 12 TABLET | Refills: 0 | Status: SHIPPED | OUTPATIENT
Start: 2024-07-16

## 2024-07-16 RX ORDER — DICYCLOMINE HYDROCHLORIDE 10 MG/1
10 CAPSULE ORAL 4 TIMES DAILY PRN
Qty: 16 CAPSULE | Refills: 0 | Status: SHIPPED | OUTPATIENT
Start: 2024-07-16

## 2024-07-16 NOTE — PATIENT INSTRUCTIONS
Continue to monitor symptoms closely.  If no improvement with prescribed medication in the next 24 hours recommend you be seen in person at your nearest ER.    [place] diet and increase fluids.    If symptoms worsen or do not improve follow up with your PCP or visit your nearest Urgent Care Center or ER.

## 2024-07-16 NOTE — LETTER
July 16, 2024     Patient: Bing Poe   YOB: 1990   Date of Visit: 7/16/2024       To Whom It May Concern:    It is my medical opinion that Bing Poe may return to work on Thursday 7/18/2024.           Sincerely,    OBI Kimball    CC:   No Recipients

## 2024-07-16 NOTE — PROGRESS NOTES
Subjective   Chief Complaint   Patient presents with    Diarrhea    Vomiting    Abdominal Cramping       Bing Poe is a 33 y.o. female.     History of Present Illness  Patient reports nausea, vomiting, diarrhea and left side pain for the past 2 days.  She states pain in the left side feels like a cramp and is intermittent.  She feels this pain the most right before she has a bowel movement.  All symptoms are exacerbated with eating.  She has had similar symptoms in the past and had a full workup done and was seen by a GI specialist with no diagnosis.  She is unsure of sick contacts.  Denies fever, blood in stool.  GI Problem  The primary symptoms include abdominal pain, nausea, vomiting and diarrhea. Primary symptoms do not include fever, weight loss, fatigue, melena, hematemesis, jaundice, hematochezia, dysuria, myalgias or rash. The illness began 2 days ago. The problem has not changed since onset.  The abdominal pain began 2 days ago. The abdominal pain has been unchanged since its onset. The abdominal pain is located in the left flank, LLQ and LUQ. The abdominal pain does not radiate.   The vomiting began 2 days ago. Vomiting occurs 2 to 5 times per day. The emesis contains stomach contents.   The diarrhea began 2 days ago. The diarrhea is watery. The diarrhea occurs more than 10 times per day.   The illness does not include chills, anorexia, dysphagia, odynophagia, bloating, constipation, tenesmus, back pain or itching.        Allergies   Allergen Reactions    Sulfamethoxazole-Trimethoprim Anaphylaxis and Swelling    Heparin Rash     Patient was told to take benadryl with Heparin. Patient states she had a rash and was told to take benedryl to help with Rash and itchy throat        Past Medical History:   Diagnosis Date    ADHD 11/22/2021    Asthma     History of pulmonary embolism     on OCPs    Lupus anticoagulant positive     Migraines     Urinary incontinence 03/18/2021       History  reviewed. No pertinent surgical history.    Social History     Socioeconomic History    Marital status:      Spouse name: Mica     Number of children: 4    Years of education: 12    Highest education level: High school graduate   Tobacco Use    Smoking status: Never     Passive exposure: Never    Smokeless tobacco: Never   Vaping Use    Vaping status: Never Used   Substance and Sexual Activity    Alcohol use: Yes     Comment: occasional    Drug use: Never    Sexual activity: Yes     Partners: Male     Birth control/protection: Condom       Family History   Problem Relation Age of Onset    Diabetes Mother     Diabetes Maternal Aunt     Heart disease Other     Breast cancer Neg Hx     Ovarian cancer Neg Hx     Uterine cancer Neg Hx     Prostate cancer Neg Hx     Colon cancer Neg Hx          Current Outpatient Medications:     ferrous gluconate (FERGON) 324 MG tablet, , Disp: , Rfl:     dicyclomine (BENTYL) 10 MG capsule, Take 1 capsule by mouth 4 (Four) Times a Day As Needed for Abdominal Cramping., Disp: 16 capsule, Rfl: 0    Insulin Pen Needle 31G X 6 MM misc, 1 each 1 (One) Time Per Week., Disp: 50 each, Rfl: 0    loperamide (Imodium A-D) 2 MG tablet, Take 1 tablet by mouth 4 (Four) Times a Day As Needed for Diarrhea., Disp: 12 tablet, Rfl: 0    meloxicam (Mobic) 15 MG tablet, Take 1 tablet by mouth Daily., Disp: 30 tablet, Rfl: 0    ondansetron ODT (ZOFRAN-ODT) 8 MG disintegrating tablet, Place 1 tablet on the tongue Every 8 (Eight) Hours As Needed for Nausea or Vomiting., Disp: 12 tablet, Rfl: 0    Tirzepatide (Mounjaro) 2.5 MG/0.5ML solution pen-injector pen, INJECT 0.5 ML SUBCUTANEOUSLY INTO THE APPROPRIATE AREA AS DIRECTED ONCE WEEKLY, Disp: 2 mL, Rfl: 1      Review of Systems   Constitutional:  Positive for activity change and appetite change. Negative for chills, diaphoresis, fatigue, fever and unexpected weight loss.   Gastrointestinal:  Positive for abdominal pain, diarrhea, nausea and vomiting.  Negative for abdominal distention, anal bleeding, anorexia, bloating, blood in stool, constipation, dysphagia, hematemesis, hematochezia, jaundice, melena, rectal pain, GERD and indigestion.   Genitourinary:  Negative for dysuria.   Musculoskeletal:  Negative for back pain and myalgias.   Skin:  Negative for itching and rash.        There were no vitals filed for this visit.    Objective   Physical Exam  Constitutional:       General: She is not in acute distress.     Appearance: Normal appearance. She is not ill-appearing, toxic-appearing or diaphoretic.   HENT:      Head: Normocephalic.      Mouth/Throat:      Lips: Pink.      Mouth: Mucous membranes are moist.   Pulmonary:      Effort: Pulmonary effort is normal.   Abdominal:      Tenderness: There is abdominal tenderness in the left upper quadrant and left lower quadrant.      Comments: Mild tenderness     Neurological:      Mental Status: She is alert and oriented to person, place, and time.          Procedures     Assessment & Plan   Diagnoses and all orders for this visit:    1. GI problem (Primary)  -     dicyclomine (BENTYL) 10 MG capsule; Take 1 capsule by mouth 4 (Four) Times a Day As Needed for Abdominal Cramping.  Dispense: 16 capsule; Refill: 0  -     ondansetron ODT (ZOFRAN-ODT) 8 MG disintegrating tablet; Place 1 tablet on the tongue Every 8 (Eight) Hours As Needed for Nausea or Vomiting.  Dispense: 12 tablet; Refill: 0  -     loperamide (Imodium A-D) 2 MG tablet; Take 1 tablet by mouth 4 (Four) Times a Day As Needed for Diarrhea.  Dispense: 12 tablet; Refill: 0      Continue to monitor symptoms closely.  If no improvement with prescribed medication in the next 24 hours recommend you be seen in person at your nearest ER.    [place] diet and increase fluids.    If symptoms worsen or do not improve follow up with your PCP or visit your nearest Urgent Care Center or ER.      PLAN: Discussed dosing, side effects, recommended other symptomatic care.   Patient should follow up with primary care provider, Urgent Care or ER if symptoms worsen, fail to resolve or other symptoms need attention. Patient/family agree to the above.         OBI Kimball     The use of a video visit has been reviewed with the patient and verbal informed consent has been obtained. Myself and Bing Poe participated in this visit. The patient is located at [address] [place] [address]. I am located in [place], [place]. Mychart and Zoom were utilized.        This visit was performed via Telehealth.  This patient has been instructed to follow-up with their primary care provider if their symptoms worsen or the treatment provided does not resolve their illness.

## 2024-08-05 PROBLEM — M54.50 LOW BACK PAIN: Status: ACTIVE | Noted: 2024-08-05

## 2024-08-05 PROCEDURE — 87480 CANDIDA DNA DIR PROBE: CPT

## 2024-08-05 PROCEDURE — 87660 TRICHOMONAS VAGIN DIR PROBE: CPT

## 2024-08-05 PROCEDURE — 87510 GARDNER VAG DNA DIR PROBE: CPT

## 2024-08-06 ENCOUNTER — TELEPHONE (OUTPATIENT)
Dept: URGENT CARE | Facility: CLINIC | Age: 34
End: 2024-08-06
Payer: COMMERCIAL

## 2024-08-07 DIAGNOSIS — B96.89 BV (BACTERIAL VAGINOSIS): Primary | ICD-10-CM

## 2024-08-07 DIAGNOSIS — N76.0 BV (BACTERIAL VAGINOSIS): Primary | ICD-10-CM

## 2024-08-07 RX ORDER — METRONIDAZOLE 500 MG/1
500 TABLET ORAL 2 TIMES DAILY
Qty: 14 TABLET | Refills: 0 | Status: SHIPPED | OUTPATIENT
Start: 2024-08-07 | End: 2024-08-14

## 2024-08-08 ENCOUNTER — OFFICE VISIT (OUTPATIENT)
Dept: UROLOGY | Facility: CLINIC | Age: 34
End: 2024-08-08
Payer: COMMERCIAL

## 2024-08-08 VITALS
WEIGHT: 245 LBS | HEIGHT: 65 IN | DIASTOLIC BLOOD PRESSURE: 69 MMHG | SYSTOLIC BLOOD PRESSURE: 124 MMHG | BODY MASS INDEX: 40.82 KG/M2 | HEART RATE: 74 BPM

## 2024-08-08 DIAGNOSIS — N39.3 STRESS INCONTINENCE: ICD-10-CM

## 2024-08-08 DIAGNOSIS — N39.41 URGE INCONTINENCE OF URINE: Primary | ICD-10-CM

## 2024-08-08 PROBLEM — B07.9 WART OF HAND: Status: RESOLVED | Noted: 2023-05-21 | Resolved: 2024-08-08

## 2024-08-08 PROBLEM — Z86.711 HISTORY OF PULMONARY EMBOLISM: Chronic | Status: RESOLVED | Noted: 2021-09-28 | Resolved: 2024-08-08

## 2024-08-08 LAB — SPECIMEN VOL 24H UR: 0 L

## 2024-08-08 RX ORDER — NITROFURANTOIN 25; 75 MG/1; MG/1
CAPSULE ORAL
Qty: 6 CAPSULE | Refills: 0 | Status: SHIPPED | OUTPATIENT
Start: 2024-08-08

## 2024-08-08 NOTE — PROGRESS NOTES
"Chief Complaint: Urinary Incontinence    Subjective         History of Present Illness  Bing Poe is a 33 y.o. female presents to [facility] to be seen for urinary incontiencne.    At last visit started patient on Oxybutynin.    Since we last saw her she has been lost to follow-up.  Does appear that she has been seen in the emergency department and urgent care several times for possible UTI and UTI symptoms as well as abdominal pain.    Patient had a CT of the abdomen pelvis with contrast performed on 4/11/2024 which revealed no evidence for acute abnormality throughout the abdomen or pelvis.  Evidence for cholelithiasis without signs of cholecystitis.  No biliary dilation seen.    She has tested positive several times for Gardnerella vaginalis however has had no positive urine cultures coinciding with her urinary symptoms.    Her last positive culture was a weakly positive culture that revealed 25,000 colony-forming units per milliliter of mixed gram-positive luis.    She stopped oxybutynin after 30 days. Does not know if this helped or not.    She has seen ob/ gyn NP who recommended urethral bulking for leakage. Per the last note left lower quadrant/pelvic pain for the past 2 months, initially sharp, now rare, not as intense. The pain is increased with intercourse, experiencing bleeding x 1 day after intercourse. Last experienced left sided pelvic pain 6 days ago with intercourse.   Was seen by PCP and imaging ordered. Pelvic ultrasound   WNL. Hx chronic bladder leakage and urinary frequency, has increased over past 2 months. Leak all day long without warning. Denies painful urination. Has seen [facility] and was prescribed oxybutinin last year, stopped medication, was not efficacious.\"    She states she is with urgency and frequency but mostly insensate incontinence.    She is wearing pads daily.     She feels leakage may be contributing to the recurrent " BV.           Previous:    I last saw the patient back in 2021 recommended PFPT.    She reports that she is having to wear a pad daily.     She is wearing a single pad a day.     She states around her period she has more urgency and frequency.    She states that at times she feels like she feels like a utis with the frequency she has. She states she voids every hour.    She does leak with cough laugh and sneeze.    Nocturia x 2     She drinks water, coffee (reports 1.5 cups) and soda x2 daily.    She does have issues with recurrent BV.    No hx of renal stones.     No family hx of  malignancies.            Objective     Past Medical History:   Diagnosis Date    ADHD 11/22/2021    Asthma     History of pulmonary embolism     on OCPs    Lupus anticoagulant positive     Migraines     Urinary incontinence 03/18/2021       History reviewed. No pertinent surgical history.      Current Outpatient Medications:     dicyclomine (BENTYL) 10 MG capsule, Take 1 capsule by mouth 4 (Four) Times a Day As Needed for Abdominal Cramping., Disp: 16 capsule, Rfl: 0    ferrous gluconate (FERGON) 324 MG tablet, , Disp: , Rfl:     Insulin Pen Needle 31G X 6 MM misc, 1 each 1 (One) Time Per Week., Disp: 50 each, Rfl: 0    loperamide (Imodium A-D) 2 MG tablet, Take 1 tablet by mouth 4 (Four) Times a Day As Needed for Diarrhea., Disp: 12 tablet, Rfl: 0    meloxicam (Mobic) 15 MG tablet, Take 1 tablet by mouth Daily., Disp: 30 tablet, Rfl: 0    metroNIDAZOLE (FLAGYL) 500 MG tablet, Take 1 tablet by mouth 2 (Two) Times a Day for 7 days., Disp: 14 tablet, Rfl: 0    ondansetron ODT (ZOFRAN-ODT) 8 MG disintegrating tablet, Place 1 tablet on the tongue Every 8 (Eight) Hours As Needed for Nausea or Vomiting., Disp: 12 tablet, Rfl: 0    Tirzepatide (Mounjaro) 2.5 MG/0.5ML solution pen-injector pen, INJECT 0.5 ML SUBCUTANEOUSLY INTO THE APPROPRIATE AREA AS DIRECTED ONCE WEEKLY, Disp: 2 mL, Rfl: 1    nitrofurantoin, macrocrystal-monohydrate,  "(MACROBID) 100 MG capsule, 1 cap by mouth twice a day starting day before urology procedure until gone, Disp: 6 capsule, Rfl: 0    Allergies   Allergen Reactions    Sulfamethoxazole-Trimethoprim Anaphylaxis and Swelling    Heparin Rash     Patient was told to take benadryl with Heparin. Patient states she had a rash and was told to take benedryl to help with Rash and itchy throat         Family History   Problem Relation Age of Onset    Diabetes Mother     Diabetes Maternal Aunt     Heart disease Other     Breast cancer Neg Hx     Ovarian cancer Neg Hx     Uterine cancer Neg Hx     Prostate cancer Neg Hx     Colon cancer Neg Hx        Social History     Socioeconomic History    Marital status:      Spouse name: Mica     Number of children: 4    Years of education: 12    Highest education level: High school graduate   Tobacco Use    Smoking status: Never     Passive exposure: Never    Smokeless tobacco: Never   Vaping Use    Vaping status: Never Used   Substance and Sexual Activity    Alcohol use: Yes     Comment: occasional    Drug use: Never    Sexual activity: Yes     Partners: Male     Birth control/protection: Condom       Vital Signs:   /69   Pulse 74   Ht 165.1 cm (65\")   Wt 111 kg (245 lb)   BMI 40.77 kg/m²      Physical Exam     Result Review :   The following data was reviewed by: OBI Pruitt on 8/8/2024:  Results for orders placed or performed in visit on 08/08/24   Bladder Scan   Result Value Ref Range    Volume 0             Procedures        Assessment and Plan    Diagnoses and all orders for this visit:    1. Urge incontinence of urine (Primary)  -     Bladder Scan  -     Cystometrogram; Future  -     nitrofurantoin, macrocrystal-monohydrate, (MACROBID) 100 MG capsule; 1 cap by mouth twice a day starting day before urology procedure until gone  Dispense: 6 capsule; Refill: 0    2. Stress incontinence  -     Cystometrogram; Future  -     nitrofurantoin, " macrocrystal-monohydrate, (MACROBID) 100 MG capsule; 1 cap by mouth twice a day starting day before urology procedure until gone  Dispense: 6 capsule; Refill: 0        Recommended starting on probiotics to help prevent bacterial vaginosis recurrence.    Did discuss that bacterial vaginosis is not a sexually transmitted infection however if a partner also has that it could be passed back-and-forth and she may consider having her partner treated.    We attempted to elicit ISD in office today however as her bladder was empty she was not able to elicit this response.    Will go ahead and order a urodynamics test to delineate underlying etiology of her urinary incontinence and follow-up with her on those results.  Next  Prophylactic antibiotic prescribed due to instrumentation.          I spent 10 minutes caring for Bing on this date of service. This time includes time spent by me in the following activities:reviewing tests, obtaining and/or reviewing a separately obtained history, performing a medically appropriate examination and/or evaluation , counseling and educating the patient/family/caregiver, ordering medications, tests, or procedures, and documenting information in the medical record  Follow Up   No follow-ups on file.  Patient was given instructions and counseling regarding her condition or for health maintenance advice. Please see specific information pulled into the AVS if appropriate.         This document has been electronically signed by OBI Pruitt  August 8, 2024 16:47 EDT

## 2024-08-14 ENCOUNTER — TELEPHONE (OUTPATIENT)
Dept: FAMILY MEDICINE CLINIC | Facility: CLINIC | Age: 34
End: 2024-08-14
Payer: COMMERCIAL

## 2024-08-14 DIAGNOSIS — R19.8 GI PROBLEM: ICD-10-CM

## 2024-08-14 DIAGNOSIS — E66.09 CLASS 2 OBESITY DUE TO EXCESS CALORIES WITHOUT SERIOUS COMORBIDITY WITH BODY MASS INDEX (BMI) OF 38.0 TO 38.9 IN ADULT: Chronic | ICD-10-CM

## 2024-08-14 NOTE — TELEPHONE ENCOUNTER
Caller: Bing Morton    Relationship: Self    Best call back number: [phone]     Which medication are you concerned about:     PATIENT WAS SEEN AT [facility] AND DIAGNOSED WITH BV AND WAS GIVEN TWO MEDICATIONS.    Who prescribed you this medication: [facility]    When did you start taking this medication: 8.8.2024    What are your concerns: MAKING PATIENT SICK    IS THERE AN ALTERNATIVE MEDICATION THAT CAN BE CALLED IN OR SOMETHING TO HELP WITH THE SIDE EFFECTS TO     [facility]/pharmacy #[id number] - [place], [place] - [address] [facility] [phone] [facility] [phone] [facility] SCHEDULED FIRST AVAILABLE APPOINTMENT WITH DR. MIRELES FOR 8.29.2024.    PLEASE CALL TO ADVISE OR SCHEDULE SOONER APPOINTMENT IF POSSIBLE.         MYCHART NO, CALL PREFERRED MAY LEAVE VOICEMAIL.

## 2024-08-14 NOTE — TELEPHONE ENCOUNTER
Caller: Bing Morton    Relationship: Self    Best call back number: [phone]     Requested Prescriptions:   Requested Prescriptions     Pending Prescriptions Disp Refills    Tirzepatide (Mounjaro) 2.5 MG/0.5ML solution pen-injector pen 2 mL 1        Pharmacy where request should be sent: [facility]/PHARMACY #[id number] - [place], [place] - [address] [facility] [phone] [facility] [phone] [facility]     Last office visit with prescribing clinician: 6/5/2023   Last telemedicine visit with prescribing clinician: Visit date not found   Next office visit with prescribing clinician: 8/29/2024     Additional details provided by patient: COMPLETELY OUT OF MEDICATION NEXT DOSE DUE 8.16.2024    Does the patient have less than a 3 day supply:  [x] Yes  [] No    Would you like a call back once the refill request has been completed: [x] Yes [] No    If the office needs to give you a call back, can they leave a voicemail: [x] Yes [] No    Cristobal Brown Rep   08/14/24 16:57 EDT         [facility] YES OR CALL

## 2024-08-15 RX ORDER — ONDANSETRON 8 MG/1
8 TABLET, ORALLY DISINTEGRATING ORAL EVERY 8 HOURS PRN
Qty: 15 TABLET | Refills: 0 | Status: SHIPPED | OUTPATIENT
Start: 2024-08-15

## 2024-08-15 RX ORDER — CLINDAMYCIN PHOSPHATE 100 MG/5G
1 CREAM VAGINAL ONCE
Qty: 5 G | Refills: 0 | Status: SHIPPED | OUTPATIENT
Start: 2024-08-15 | End: 2024-08-15

## 2024-08-15 NOTE — TELEPHONE ENCOUNTER
Patient states once she takes the medication, she has been vomiting shortly after. Is willingly offering to try Zofran but wanted to know if it would prevent her from vomiting and not just the nausea

## 2024-08-15 NOTE — TELEPHONE ENCOUNTER
Ill send zofran but alternative is clindamycin cream it says if its clindesse its just one applicatorful 1x

## 2024-08-15 NOTE — TELEPHONE ENCOUNTER
Is she almost finished with med or just started? I could send zofran to help for now but that med works the best to treat

## 2024-08-19 NOTE — TELEPHONE ENCOUNTER
Tried to reach patient, no answer, VM box is full.   Closing encounter after 3 failed attempts to reach patient.